# Patient Record
Sex: FEMALE | Race: WHITE | Employment: OTHER | URBAN - METROPOLITAN AREA
[De-identification: names, ages, dates, MRNs, and addresses within clinical notes are randomized per-mention and may not be internally consistent; named-entity substitution may affect disease eponyms.]

---

## 2025-02-05 NOTE — CPM/PAT H&P
CPM/PAT Evaluation       Name: Monae Mayes (Monae Mayes)  /Age: 1965/59 y.o.     In-Person       Chief Complaint: ***    HPI    Past Medical History:   Diagnosis Date    Personal history of other diseases of the nervous system and sense organs     History of cataract    Personal history of other diseases of the respiratory system     History of bronchitis       Past Surgical History:   Procedure Laterality Date    CATARACT EXTRACTION  03/10/2015    Cataract Surgery       Patient  has no history on file for sexual activity.    No family history on file.    Not on File    Prior to Admission medications    Not on File        PAT ROS     PAT Physical Exam     Airway:  Anesthesia:  Patient denies any anesthesia complications.   Teeth:    Testing/Diagnostic:         Patient Specialist/PCP:     There were no vitals taken for this visit.    DASI Risk Score    No data to display       Caprini DVT Assessment    No data to display       Modified Frailty Index    No data to display       CHADS2 Stroke Risk  Current as of 4 days ago        N/A 3 to 100%: High Risk   2 to < 3%: Medium Risk   0 to < 2%: Low Risk     Last Change: N/A          This score determines the patient's risk of having a stroke if the patient has atrial fibrillation.        This score is not applicable to this patient. Components are not calculated.          Revised Cardiac Risk Index    No data to display       Apfel Simplified Score    No data to display       Risk Analysis Index Results This Encounter    No data found in the last 10 encounters.       Prodigy: High Risk  Total Score: 8              Prodigy Age Score           ARISCAT Score for Postoperative Pulmonary Complications    No data to display       Hanley Perioperative Risk for Myocardial Infarction or Cardiac Arrest (GISELE)    No data to display       Assessment and Plan:   Plan for OR on  for   FUSION, SUBTALAR JOINT [83475 (CPT®)] Left General   FUSION, JOINT, FOOT [29286  (CPT®)] Left General   RECESSION, MUSCLE, GASTROCNEMIUS [45760 (CPT®)] Left General   BMP, CBC with diff    HEENT/Airway:  no significant findings on chart review or clinical presentation    Cardiovascular:  no significant findings on chart review or clinical presentation  DASI  RCRI: {RCRI Points:95863} risk for postoperative MACE   GISELE: ***% risk for postoperative MACE      Pulmonary:  no significant findings on chart review or clinical presentation    Preoperative deep breathing educational handout provided to patient.  ARISCAT:      points which is a {samariscat:81427} risk of in-hospital post-op pulmonary complications   STOP BANG:     points which is a {samprodigy:86981} risk for moderate to severe JOY    Renal: no significant findings on chart review or clinical presentation    Endocrine:  no significant findings on chart review or clinical presentation    Hematologic:   no significant findings on chart review or clinical presentation    Preoperative DVT educational handout provided to patient.    Caprini Score:    points which is a {samcaprini:04974} risk of perioperative VTE    Gastrointestinal:   no significant findings on chart review or clinical presentation    Apfel: {samapfel1:16673} points {samapfel2:85710} risk for post operative N/V    Infectious disease:  no significant findings on chart review or clinical presentation     Musculoskeletal:      Neuro:    No neurologic diagnoses, however, the patient is at an increased risk for post operative delirium secondary to {samneurosmart:71724}.  Preoperative brain exercise educational handout provided to patient.    The patient is at an increased risk for perioperative stroke secondary to {samneurostroke:76688}.

## 2025-02-06 ENCOUNTER — PRE-ADMISSION TESTING (OUTPATIENT)
Dept: PREADMISSION TESTING | Facility: HOSPITAL | Age: 60
End: 2025-02-06
Payer: COMMERCIAL

## 2025-02-06 ENCOUNTER — LAB (OUTPATIENT)
Dept: LAB | Facility: HOSPITAL | Age: 60
End: 2025-02-06
Payer: COMMERCIAL

## 2025-02-06 DIAGNOSIS — Z01.818 PRE-OP TESTING: Primary | ICD-10-CM

## 2025-02-06 LAB
ANION GAP SERPL CALC-SCNC: 13 MMOL/L (ref 10–20)
BASOPHILS # BLD AUTO: 0.02 X10*3/UL (ref 0–0.1)
BASOPHILS NFR BLD AUTO: 0.3 %
BUN SERPL-MCNC: 20 MG/DL (ref 6–23)
CALCIUM SERPL-MCNC: 9.9 MG/DL (ref 8.6–10.3)
CHLORIDE SERPL-SCNC: 103 MMOL/L (ref 98–107)
CO2 SERPL-SCNC: 30 MMOL/L (ref 21–32)
CREAT SERPL-MCNC: 0.65 MG/DL (ref 0.5–1.05)
EGFRCR SERPLBLD CKD-EPI 2021: >90 ML/MIN/1.73M*2
EOSINOPHIL # BLD AUTO: 0.14 X10*3/UL (ref 0–0.7)
EOSINOPHIL NFR BLD AUTO: 2.1 %
ERYTHROCYTE [DISTWIDTH] IN BLOOD BY AUTOMATED COUNT: 13.3 % (ref 11.5–14.5)
GLUCOSE SERPL-MCNC: 96 MG/DL (ref 74–99)
HCT VFR BLD AUTO: 37.8 % (ref 36–46)
HGB BLD-MCNC: 12.3 G/DL (ref 12–16)
IMM GRANULOCYTES # BLD AUTO: 0.04 X10*3/UL (ref 0–0.7)
IMM GRANULOCYTES NFR BLD AUTO: 0.6 % (ref 0–0.9)
LYMPHOCYTES # BLD AUTO: 1.23 X10*3/UL (ref 1.2–4.8)
LYMPHOCYTES NFR BLD AUTO: 18.1 %
MCH RBC QN AUTO: 28.3 PG (ref 26–34)
MCHC RBC AUTO-ENTMCNC: 32.5 G/DL (ref 32–36)
MCV RBC AUTO: 87 FL (ref 80–100)
MONOCYTES # BLD AUTO: 0.47 X10*3/UL (ref 0.1–1)
MONOCYTES NFR BLD AUTO: 6.9 %
NEUTROPHILS # BLD AUTO: 4.88 X10*3/UL (ref 1.2–7.7)
NEUTROPHILS NFR BLD AUTO: 72 %
NRBC BLD-RTO: 0 /100 WBCS (ref 0–0)
PLATELET # BLD AUTO: 292 X10*3/UL (ref 150–450)
POTASSIUM SERPL-SCNC: 4.5 MMOL/L (ref 3.5–5.3)
RBC # BLD AUTO: 4.34 X10*6/UL (ref 4–5.2)
SODIUM SERPL-SCNC: 141 MMOL/L (ref 136–145)
WBC # BLD AUTO: 6.8 X10*3/UL (ref 4.4–11.3)

## 2025-02-06 PROCEDURE — 85025 COMPLETE CBC W/AUTO DIFF WBC: CPT

## 2025-02-06 PROCEDURE — 80048 BASIC METABOLIC PNL TOTAL CA: CPT

## 2025-02-12 NOTE — CPM/PAT H&P
CPM/PAT Evaluation       Name: Monae Mayes (Monae Mayes)  /Age: 1965/59 y.o.     In-Person       Chief Complaint: pain in left foot    HPI  This is a very pleasant 58 yo with PmHx of ADHD, tibial tendonitis, and acquired left flat foot.  Pt states she is no longer able to walk long distances due to foot pain.  She denies recent fever or chills.    Past Medical History:   Diagnosis Date    ADHD (attention deficit hyperactivity disorder)     Arthritis     Flat foot (pes planus) (acquired), left foot     Flat foot (pes planus) (acquired), left foot     Obesity     Personal history of other diseases of the nervous system and sense organs     History of cataract    Personal history of other diseases of the respiratory system     History of bronchitis    Posterior tibial tendinitis, left leg     Posterior tibial tendinitis, left leg     Sleep apnea        Past Surgical History:   Procedure Laterality Date    CATARACT EXTRACTION  03/10/2015    Cataract Surgery    ENDOMETRIAL ABLATION      FOOT SURGERY      HYSTERECTOMY      JOINT REPLACEMENT      OTHER SURGICAL HISTORY      SINUS SURGERY         Patient  has no history on file for sexual activity.    Family History   Problem Relation Name Age of Onset    Arthritis Mother Angela Tristan     Arthritis Maternal Grandmother Danielle Baclene     Stroke Maternal Grandmother Danielle Baclene     Stroke Maternal Grandfather      Stroke Paternal Grandmother      Stroke Paternal Grandfather         Allergies   Allergen Reactions    Prochlorperazine Other     Muscle contractions and eyes rolling back       Prior to Admission medications    Not on File      Current Outpatient Medications on File Prior to Visit   Medication Sig Dispense Refill    FLUoxetine (PROzac) 20 mg capsule Take 1 capsule (20 mg) by mouth early in the morning..      fluticasone (Flonase) 50 mcg/actuation nasal spray Administer 1 spray into affected nostril(s) 2 times a day.      meloxicam (Mobic) 15 mg tablet  Take 1 tablet (15 mg) by mouth once daily.      multivitamin tablet Take 1 tablet by mouth once daily.      pramipexole (Mirapex) 0.125 mg tablet Take 1 tablet (0.125 mg) by mouth once daily.       No current facility-administered medications on file prior to visit.       PAT ROS:   Constitutional:   neg    Neuro/Psych:   neg    Eyes:   neg     use of corrective lenses  Ears:   neg    Nose:   neg    Mouth:   neg    Throat:   neg    Neck:    No carotid bruits auscultated  neg    Cardio:   neg    Respiratory:   neg    Endocrine:   neg    GI:   neg    :   neg    Musculoskeletal:    See HPI  Hematologic:   neg    Skin:  neg        Physical Exam  Constitutional:       Appearance: Normal appearance.   HENT:      Head: Normocephalic and atraumatic.      Nose: Nose normal.      Mouth/Throat:      Mouth: Mucous membranes are moist.   Eyes:      Pupils: Pupils are equal, round, and reactive to light.   Neck:      Comments: No carotid bruits auscultated  Cardiovascular:      Rate and Rhythm: Normal rate and regular rhythm.   Pulmonary:      Effort: Pulmonary effort is normal.      Breath sounds: Normal breath sounds.   Abdominal:      General: Bowel sounds are normal.      Palpations: Abdomen is soft.   Musculoskeletal:      Cervical back: Normal range of motion.      Comments: No LE ankle edema   Skin:     General: Skin is warm and dry.   Neurological:      General: No focal deficit present.      Mental Status: She is alert and oriented to person, place, and time.   Psychiatric:         Mood and Affect: Mood normal.         Behavior: Behavior normal.      Airway: nl neck ROM  Anesthesia:  Patient denies any anesthesia complications.   Teeth: intact    Testing/Diagnostic:   ECG: NSR rate of 80    Recent Results (from the past 3 weeks)   CBC and Auto Differential    Collection Time: 02/06/25  8:37 AM   Result Value Ref Range    WBC 6.8 4.4 - 11.3 x10*3/uL    nRBC 0.0 0.0 - 0.0 /100 WBCs    RBC 4.34 4.00 - 5.20 x10*6/uL     Hemoglobin 12.3 12.0 - 16.0 g/dL    Hematocrit 37.8 36.0 - 46.0 %    MCV 87 80 - 100 fL    MCH 28.3 26.0 - 34.0 pg    MCHC 32.5 32.0 - 36.0 g/dL    RDW 13.3 11.5 - 14.5 %    Platelets 292 150 - 450 x10*3/uL    Neutrophils % 72.0 40.0 - 80.0 %    Immature Granulocytes %, Automated 0.6 0.0 - 0.9 %    Lymphocytes % 18.1 13.0 - 44.0 %    Monocytes % 6.9 2.0 - 10.0 %    Eosinophils % 2.1 0.0 - 6.0 %    Basophils % 0.3 0.0 - 2.0 %    Neutrophils Absolute 4.88 1.20 - 7.70 x10*3/uL    Immature Granulocytes Absolute, Automated 0.04 0.00 - 0.70 x10*3/uL    Lymphocytes Absolute 1.23 1.20 - 4.80 x10*3/uL    Monocytes Absolute 0.47 0.10 - 1.00 x10*3/uL    Eosinophils Absolute 0.14 0.00 - 0.70 x10*3/uL    Basophils Absolute 0.02 0.00 - 0.10 x10*3/uL   Basic metabolic panel    Collection Time: 02/06/25  8:37 AM   Result Value Ref Range    Glucose 96 74 - 99 mg/dL    Sodium 141 136 - 145 mmol/L    Potassium 4.5 3.5 - 5.3 mmol/L    Chloride 103 98 - 107 mmol/L    Bicarbonate 30 21 - 32 mmol/L    Anion Gap 13 10 - 20 mmol/L    Urea Nitrogen 20 6 - 23 mg/dL    Creatinine 0.65 0.50 - 1.05 mg/dL    eGFR >90 >60 mL/min/1.73m*2    Calcium 9.9 8.6 - 10.3 mg/dL             Recent Results (from the past 2 weeks)   CBC and Auto Differential    Collection Time: 02/06/25  8:37 AM   Result Value Ref Range    WBC 6.8 4.4 - 11.3 x10*3/uL    nRBC 0.0 0.0 - 0.0 /100 WBCs    RBC 4.34 4.00 - 5.20 x10*6/uL    Hemoglobin 12.3 12.0 - 16.0 g/dL    Hematocrit 37.8 36.0 - 46.0 %    MCV 87 80 - 100 fL    MCH 28.3 26.0 - 34.0 pg    MCHC 32.5 32.0 - 36.0 g/dL    RDW 13.3 11.5 - 14.5 %    Platelets 292 150 - 450 x10*3/uL    Neutrophils % 72.0 40.0 - 80.0 %    Immature Granulocytes %, Automated 0.6 0.0 - 0.9 %    Lymphocytes % 18.1 13.0 - 44.0 %    Monocytes % 6.9 2.0 - 10.0 %    Eosinophils % 2.1 0.0 - 6.0 %    Basophils % 0.3 0.0 - 2.0 %    Neutrophils Absolute 4.88 1.20 - 7.70 x10*3/uL    Immature Granulocytes Absolute, Automated 0.04 0.00 - 0.70 x10*3/uL     "Lymphocytes Absolute 1.23 1.20 - 4.80 x10*3/uL    Monocytes Absolute 0.47 0.10 - 1.00 x10*3/uL    Eosinophils Absolute 0.14 0.00 - 0.70 x10*3/uL    Basophils Absolute 0.02 0.00 - 0.10 x10*3/uL   Basic metabolic panel    Collection Time: 02/06/25  8:37 AM   Result Value Ref Range    Glucose 96 74 - 99 mg/dL    Sodium 141 136 - 145 mmol/L    Potassium 4.5 3.5 - 5.3 mmol/L    Chloride 103 98 - 107 mmol/L    Bicarbonate 30 21 - 32 mmol/L    Anion Gap 13 10 - 20 mmol/L    Urea Nitrogen 20 6 - 23 mg/dL    Creatinine 0.65 0.50 - 1.05 mg/dL    eGFR >90 >60 mL/min/1.73m*2    Calcium 9.9 8.6 - 10.3 mg/dL     Patient Specialist/PCP:     Visit Vitals  /82   Pulse 80   Temp 36 °C (96.8 °F) (Temporal)   Resp 16   Ht 1.676 m (5' 6\")   Wt 120 kg (265 lb 9.6 oz)   SpO2 96%   BMI 42.87 kg/m²   Smoking Status Never   BSA 2.36 m²       DASI Risk Score      Flowsheet Row Pre-Admission Testing from 2/13/2025 in [facility] Questionnaire Series Submission from 2/6/2025 in [facility] with Generic Provider Mary   Can you take care of yourself (eat, dress, bathe, or use toilet)?  2.75 filed at 02/13/2025 0803 2.75  filed at 02/06/2025 1118   Can you walk indoors, such as around your house? 1.75 filed at 02/13/2025 0803 1.75  filed at 02/06/2025 1118   Can you walk a block or two on level ground?  2.75 filed at 02/13/2025 0803 2.75  filed at 02/06/2025 1118   Can you climb a flight of stairs or walk up a hill? 5.5 filed at 02/13/2025 0803 5.5  filed at 02/06/2025 1118   Can you run a short distance? 0 filed at 02/13/2025 0803 0  filed at 02/06/2025 1118   Can you do light work around the house like dusting or washing dishes? 2.7 filed at 02/13/2025 0803 2.7  filed at 02/06/2025 1118   Can you do moderate work around the house like vacuuming, sweeping floors or carrying groceries? 3.5 filed at 02/13/2025 0803 3.5  filed at 02/06/2025 1118   Can you do heavy work around the house like scrubbing floors or " lifting and moving heavy furniture?  8 filed at 02/13/2025 0803 8  filed at 02/06/2025 1118   Can you do yard work like raking leaves, weeding or pushing a mower? 4.5 filed at 02/13/2025 0803 4.5  filed at 02/06/2025 1118   Can you have sexual relations? 5.25 filed at 02/13/2025 0803 5.25  filed at 02/06/2025 1118   Can you participate in moderate recreational activities like golf, bowling, dancing, doubles tennis or throwing a baseball or football? 6 filed at 02/13/2025 0803 6  filed at 02/06/2025 1118   Can you participate in strenous sports like swimming, singles tennis, football, basketball, or skiing? 0 filed at 02/13/2025 0803 7.5  filed at 02/06/2025 1118   DASI SCORE 42.7 filed at 02/13/2025 0803 50.2  filed at 02/06/2025 1118   METS Score (Will be calculated only when all the questions are answered) 8 filed at 02/13/2025 0803 8.9  filed at 02/06/2025 1118          Caprini DVT Assessment      Flowsheet Row Pre-Admission Testing from 2/13/2025 in [facility]   DVT Score (IF A SCORE IS NOT CALCULATING, MUST SELECT A BMI TO COMPLETE) 7 filed at 02/13/2025 0818   Surgical Factors Major surgery planned, including arthroscopic and laproscopic (1-2 hours) filed at 02/13/2025 0818   BMI (BMI MUST BE CHOSEN) 41-50 (Morbid obesity) filed at 02/13/2025 0818          Modified Frailty Index      Flowsheet Row Pre-Admission Testing from 2/13/2025 in [facility]   Non-independent functional status (problems with dressing, bathing, personal grooming, or cooking) 0 filed at 02/13/2025 0804   History of diabetes mellitus  0 filed at 02/13/2025 0804   History of COPD 0 filed at 02/13/2025 0804   History of CHF No filed at 02/13/2025 0804   History of MI 0 filed at 02/13/2025 0804   History of Percutaneous Coronary Intervention, Cardiac Surgery, or Angina No filed at 02/13/2025 0804   Hypertension requiring the use of medication  0 filed at 02/13/2025 0804   Peripheral vascular disease 0 filed at  02/13/2025 0804   Impaired sensorium (cognitive impairement or loss, clouding, or delirium) 0 filed at 02/13/2025 0804   TIA or CVA withouy residual deficit 0 filed at 02/13/2025 0804   Cerebrovascular accident with deficit 0 filed at 02/13/2025 0804   Modified Frailty Index Calculator 0 filed at 02/13/2025 0804          CHADS2 Stroke Risk  Current as of 37 minutes ago        N/A 3 to 100%: High Risk   2 to < 3%: Medium Risk   0 to < 2%: Low Risk     Last Change: N/A          This score determines the patient's risk of having a stroke if the patient has atrial fibrillation.        This score is not applicable to this patient. Components are not calculated.          Revised Cardiac Risk Index      Flowsheet Row Pre-Admission Testing from 2/13/2025 in [facility]   High-Risk Surgery (Intraperitoneal, Intrathoracic,Suprainguinal vascular) 0 filed at 02/13/2025 0803   History of ischemic heart disease (History of MI, History of positive exercuse test, Current chest paint considered due to myocardial ischemia, Use of nitrate therapy, ECG with pathological Q Waves) 0 filed at 02/13/2025 0803   History of congestive heart failure (pulmonary edemia, bilateral rales or S3 gallop, Paroxysmal nocturnal dyspnea, CXR showing pulmonary vascular redistribution) 0 filed at 02/13/2025 0803   History of cerebrovascular disease (Prior TIA or stroke) 0 filed at 02/13/2025 0803   Pre-operative insulin treatment 0 filed at 02/13/2025 0803   Pre-operative creatinine>2 mg/dl 0 filed at 02/13/2025 0803   Revised Cardiac Risk Calculator 0 filed at 02/13/2025 0803          Apfel Simplified Score      Flowsheet Row Pre-Admission Testing from 2/13/2025 in [facility]   Smoking status 1 filed at 02/13/2025 0804   History of motion sickness or PONV  0 filed at 02/13/2025 0804   Use of postoperative opioids 1 filed at 02/13/2025 0804   Gender - Female 1=Yes filed at 02/13/2025 0804   Apfel Simplified Score Calculator  3 filed at 2025 0804          Risk Analysis Index Results This Encounter         2025  0804             Do you live in a place other than your own home?: 0    When did you begin living in the place you are currently residing?: Greater than one year ago    Any kidney failure, kidney not working well, or seeing a kidney doctor (nephrologist)? If yes, was this for kidney stones or another problem?: 0 No    Any history of chronic (long-term) congestive heart failure (CHF)?: 0 No    Any shortness of breath when resting?: 0 No    In the past five years, have you been diagnosed with or treated for cancer?: No    During the last 3 months has it become difficult for you to remember things or organize your thoughts?: 0 No    Have you lost weight of 10 pounds or more in the past 3 months without trying?: 0 No    Do you have any loss of appetitie?: 0 No    Getting Around (Mobility): 0 Can get around without help    Eatin Can plan and prepare own meals    Toiletin Can use toilet without any help    Personal Hygiene (Bathing, Hand Washing, Changing Clothes): 0 Can shower or bathe without any help    [place] Cancer History: Patient does not indicate history of cancer    Total Risk Analysis Index Score Without Cancer: 18    Total Risk Analysis Index Score: 18          Stop Bang Score      Flowsheet Row Pre-Admission Testing from 2025 in [facility] Questionnaire Series Submission from 2025 in [facility] with Generic Provider Mary   Do you snore loudly? 0 filed at 2025 0803 1  filed at 2025 1118   Do you often feel tired or fatigued after your sleep? 0 filed at 2025 0803 0  filed at 2025 1118   Has anyone ever observed you stop breathing in your sleep? 0 filed at 2025 0803 0  filed at 2025 1118   Do you have or are you being treated for high blood pressure? 0 filed at 2025 0803 0  filed at 2025 1118   Recent BMI (Calculated) 42.3 filed at  02/13/2025 0803 42.3  filed at 02/06/2025 1118   Is BMI greater than 35 kg/m2? 1=Yes filed at 02/13/2025 0803 1=Yes  filed at 02/06/2025 1118   Age older than 50 years old? 1=Yes filed at 02/13/2025 0803 1=Yes  filed at 02/06/2025 1118   Is your neck circumference greater than 17 inches (Male) or 16 inches (Female)? 1 filed at 02/13/2025 0803 --   Gender - Male 0=No filed at 02/13/2025 0803 0=No  filed at 02/06/2025 1118   STOP-BANG Total Score 3 filed at 02/13/2025 0803 --          Prodigy: High Risk  Total Score: 8              Prodigy Age Score           ARISCAT Score for Postoperative Pulmonary Complications      Flowsheet Row Pre-Admission Testing from 2/13/2025 in [facility]   Age Calculated Score 3 filed at 02/13/2025 0804   Preoperative SpO2 0 filed at 02/13/2025 0804   Respiratory infection in the last month Either upper or lower (i.e., URI, bronchitis, pneumonia), with fever and antibiotic treatment 0 filed at 02/13/2025 0804   Preoperative anemia (Hgb less than 10 g/dl) 0 filed at 02/13/2025 0804   Surgical incision  0 filed at 02/13/2025 0804   Duration of surgery  0 filed at 02/13/2025 0804   Emergency Procedure  0 filed at 02/13/2025 0804   ARISCAT Total Score  3 filed at 02/13/2025 0804          [place] Perioperative Risk for Myocardial Infarction or Cardiac Arrest (GISELE)      Flowsheet Row Pre-Admission Testing from 2/13/2025 in [facility]   Calculated Age Score 1.2 filed at 02/13/2025 0804   Functional Status  0 filed at 02/13/2025 0804   ASA Class  -3.29 filed at 02/13/2025 0804   Type of Procedure  0.80 filed at 02/13/2025 0804        Assessment and Plan:     Plan for OR on for 2/18   FUSION, SUBTALAR JOINT [83226 (CPT®)] Left General   FUSION, JOINT, FOOT [98444 (CPT®)] Left General   RECESSION, MUSCLE, GASTROCNEMIUS [91077 (CPT®)] Left General   Due to Flat foot [pes planus] (acquired)   & post tibial tendinitis left  Most recent labs enclosed    HEENT/Airway:  no  significant findings on chart review or clinical presentation    Cardiovascular:  no significant findings on chart review or clinical presentation  DASI  Duke Activity Status Index (DASI)  DASI Score: 42.7   MET Score: 8  RCRI: 0 points, 3.9%  risk for postoperative MACE   GISELE: 0.14% risk for postoperative MACE      Pulmonary:  no significant findings on chart review or clinical presentation  Pt has hx of nasal surgery for deviated septum    Preoperative deep breathing educational handout provided to patient.  ARISCAT:   3   points which is a low (1.6%) risk of in-hospital post-op pulmonary complications   STOP BANG:  3   points which is a intermediate risk for moderate to severe JOY    Renal: no significant findings on chart review or clinical presentation    Endocrine:  no significant findings on chart review or clinical presentation    Hematologic:   no significant findings on chart review or clinical presentation  Preoperative DVT educational handout provided to patient.  Caprini Score: 7   points which is a high risk of perioperative VTE    Gastrointestinal:   no significant findings on chart review or clinical presentation  Apfel: 3 points 61% risk for post operative N/V    Infectious disease:  no significant findings on chart review or clinical presentation     Musculoskeletal:      Neuro:    No neurologic diagnoses  Preoperative brain exercise educational handout provided to patient.  The patient is at an increased risk for perioperative stroke secondary to female sex , general anesthesia, and op time >2.5 hours.

## 2025-02-12 NOTE — H&P (VIEW-ONLY)
CPM/PAT Evaluation       Name: Monae Mayes (Monae Mayes)  /Age: 1965/59 y.o.     In-Person       Chief Complaint: pain in left foot    HPI  This is a very pleasant 60 yo with PmHx of ADHD, tibial tendonitis, and acquired left flat foot.  Pt states she is no longer able to walk long distances due to foot pain.  She denies recent fever or chills.    Past Medical History:   Diagnosis Date    ADHD (attention deficit hyperactivity disorder)     Arthritis     Flat foot (pes planus) (acquired), left foot     Flat foot (pes planus) (acquired), left foot     Obesity     Personal history of other diseases of the nervous system and sense organs     History of cataract    Personal history of other diseases of the respiratory system     History of bronchitis    Posterior tibial tendinitis, left leg     Posterior tibial tendinitis, left leg     Sleep apnea        Past Surgical History:   Procedure Laterality Date    CATARACT EXTRACTION  03/10/2015    Cataract Surgery    ENDOMETRIAL ABLATION      FOOT SURGERY      HYSTERECTOMY      JOINT REPLACEMENT      OTHER SURGICAL HISTORY      SINUS SURGERY         Patient  has no history on file for sexual activity.    Family History   Problem Relation Name Age of Onset    Arthritis Mother Angela Tristan     Arthritis Maternal Grandmother Danielle Baclene     Stroke Maternal Grandmother Danielle Baclene     Stroke Maternal Grandfather      Stroke Paternal Grandmother      Stroke Paternal Grandfather         Allergies   Allergen Reactions    Prochlorperazine Other     Muscle contractions and eyes rolling back       Prior to Admission medications    Not on File      Current Outpatient Medications on File Prior to Visit   Medication Sig Dispense Refill    FLUoxetine (PROzac) 20 mg capsule Take 1 capsule (20 mg) by mouth early in the morning..      fluticasone (Flonase) 50 mcg/actuation nasal spray Administer 1 spray into affected nostril(s) 2 times a day.      meloxicam (Mobic) 15 mg tablet  Take 1 tablet (15 mg) by mouth once daily.      multivitamin tablet Take 1 tablet by mouth once daily.      pramipexole (Mirapex) 0.125 mg tablet Take 1 tablet (0.125 mg) by mouth once daily.       No current facility-administered medications on file prior to visit.       PAT ROS:   Constitutional:   neg    Neuro/Psych:   neg    Eyes:   neg     use of corrective lenses  Ears:   neg    Nose:   neg    Mouth:   neg    Throat:   neg    Neck:    No carotid bruits auscultated  neg    Cardio:   neg    Respiratory:   neg    Endocrine:   neg    GI:   neg    :   neg    Musculoskeletal:    See HPI  Hematologic:   neg    Skin:  neg        Physical Exam  Constitutional:       Appearance: Normal appearance.   HENT:      Head: Normocephalic and atraumatic.      Nose: Nose normal.      Mouth/Throat:      Mouth: Mucous membranes are moist.   Eyes:      Pupils: Pupils are equal, round, and reactive to light.   Neck:      Comments: No carotid bruits auscultated  Cardiovascular:      Rate and Rhythm: Normal rate and regular rhythm.   Pulmonary:      Effort: Pulmonary effort is normal.      Breath sounds: Normal breath sounds.   Abdominal:      General: Bowel sounds are normal.      Palpations: Abdomen is soft.   Musculoskeletal:      Cervical back: Normal range of motion.      Comments: No LE ankle edema   Skin:     General: Skin is warm and dry.   Neurological:      General: No focal deficit present.      Mental Status: She is alert and oriented to person, place, and time.   Psychiatric:         Mood and Affect: Mood normal.         Behavior: Behavior normal.      Airway: nl neck ROM  Anesthesia:  Patient denies any anesthesia complications.   Teeth: intact    Testing/Diagnostic:   ECG: NSR rate of 80    Recent Results (from the past 3 weeks)   CBC and Auto Differential    Collection Time: 02/06/25  8:37 AM   Result Value Ref Range    WBC 6.8 4.4 - 11.3 x10*3/uL    nRBC 0.0 0.0 - 0.0 /100 WBCs    RBC 4.34 4.00 - 5.20 x10*6/uL     Hemoglobin 12.3 12.0 - 16.0 g/dL    Hematocrit 37.8 36.0 - 46.0 %    MCV 87 80 - 100 fL    MCH 28.3 26.0 - 34.0 pg    MCHC 32.5 32.0 - 36.0 g/dL    RDW 13.3 11.5 - 14.5 %    Platelets 292 150 - 450 x10*3/uL    Neutrophils % 72.0 40.0 - 80.0 %    Immature Granulocytes %, Automated 0.6 0.0 - 0.9 %    Lymphocytes % 18.1 13.0 - 44.0 %    Monocytes % 6.9 2.0 - 10.0 %    Eosinophils % 2.1 0.0 - 6.0 %    Basophils % 0.3 0.0 - 2.0 %    Neutrophils Absolute 4.88 1.20 - 7.70 x10*3/uL    Immature Granulocytes Absolute, Automated 0.04 0.00 - 0.70 x10*3/uL    Lymphocytes Absolute 1.23 1.20 - 4.80 x10*3/uL    Monocytes Absolute 0.47 0.10 - 1.00 x10*3/uL    Eosinophils Absolute 0.14 0.00 - 0.70 x10*3/uL    Basophils Absolute 0.02 0.00 - 0.10 x10*3/uL   Basic metabolic panel    Collection Time: 02/06/25  8:37 AM   Result Value Ref Range    Glucose 96 74 - 99 mg/dL    Sodium 141 136 - 145 mmol/L    Potassium 4.5 3.5 - 5.3 mmol/L    Chloride 103 98 - 107 mmol/L    Bicarbonate 30 21 - 32 mmol/L    Anion Gap 13 10 - 20 mmol/L    Urea Nitrogen 20 6 - 23 mg/dL    Creatinine 0.65 0.50 - 1.05 mg/dL    eGFR >90 >60 mL/min/1.73m*2    Calcium 9.9 8.6 - 10.3 mg/dL             Recent Results (from the past 2 weeks)   CBC and Auto Differential    Collection Time: 02/06/25  8:37 AM   Result Value Ref Range    WBC 6.8 4.4 - 11.3 x10*3/uL    nRBC 0.0 0.0 - 0.0 /100 WBCs    RBC 4.34 4.00 - 5.20 x10*6/uL    Hemoglobin 12.3 12.0 - 16.0 g/dL    Hematocrit 37.8 36.0 - 46.0 %    MCV 87 80 - 100 fL    MCH 28.3 26.0 - 34.0 pg    MCHC 32.5 32.0 - 36.0 g/dL    RDW 13.3 11.5 - 14.5 %    Platelets 292 150 - 450 x10*3/uL    Neutrophils % 72.0 40.0 - 80.0 %    Immature Granulocytes %, Automated 0.6 0.0 - 0.9 %    Lymphocytes % 18.1 13.0 - 44.0 %    Monocytes % 6.9 2.0 - 10.0 %    Eosinophils % 2.1 0.0 - 6.0 %    Basophils % 0.3 0.0 - 2.0 %    Neutrophils Absolute 4.88 1.20 - 7.70 x10*3/uL    Immature Granulocytes Absolute, Automated 0.04 0.00 - 0.70 x10*3/uL     "Lymphocytes Absolute 1.23 1.20 - 4.80 x10*3/uL    Monocytes Absolute 0.47 0.10 - 1.00 x10*3/uL    Eosinophils Absolute 0.14 0.00 - 0.70 x10*3/uL    Basophils Absolute 0.02 0.00 - 0.10 x10*3/uL   Basic metabolic panel    Collection Time: 02/06/25  8:37 AM   Result Value Ref Range    Glucose 96 74 - 99 mg/dL    Sodium 141 136 - 145 mmol/L    Potassium 4.5 3.5 - 5.3 mmol/L    Chloride 103 98 - 107 mmol/L    Bicarbonate 30 21 - 32 mmol/L    Anion Gap 13 10 - 20 mmol/L    Urea Nitrogen 20 6 - 23 mg/dL    Creatinine 0.65 0.50 - 1.05 mg/dL    eGFR >90 >60 mL/min/1.73m*2    Calcium 9.9 8.6 - 10.3 mg/dL     Patient Specialist/PCP:     Visit Vitals  /82   Pulse 80   Temp 36 °C (96.8 °F) (Temporal)   Resp 16   Ht 1.676 m (5' 6\")   Wt 120 kg (265 lb 9.6 oz)   SpO2 96%   BMI 42.87 kg/m²   Smoking Status Never   BSA 2.36 m²       DASI Risk Score      Flowsheet Row Pre-Admission Testing from 2/13/2025 in [facility] [place] Questionnaire Series Submission from 2/6/2025 in [facility] with Generic Provider Mary   Can you take care of yourself (eat, dress, bathe, or use toilet)?  2.75 filed at 02/13/2025 0803 2.75  filed at 02/06/2025 1118   Can you walk indoors, such as around your house? 1.75 filed at 02/13/2025 0803 1.75  filed at 02/06/2025 1118   Can you walk a block or two on level ground?  2.75 filed at 02/13/2025 0803 2.75  filed at 02/06/2025 1118   Can you climb a flight of stairs or walk up a hill? 5.5 filed at 02/13/2025 0803 5.5  filed at 02/06/2025 1118   Can you run a short distance? 0 filed at 02/13/2025 0803 0  filed at 02/06/2025 1118   Can you do light work around the house like dusting or washing dishes? 2.7 filed at 02/13/2025 0803 2.7  filed at 02/06/2025 1118   Can you do moderate work around the house like vacuuming, sweeping floors or carrying groceries? 3.5 filed at 02/13/2025 0803 3.5  filed at 02/06/2025 1118   Can you do heavy work around the house like scrubbing floors or " lifting and moving heavy furniture?  8 filed at 02/13/2025 0803 8  filed at 02/06/2025 1118   Can you do yard work like raking leaves, weeding or pushing a mower? 4.5 filed at 02/13/2025 0803 4.5  filed at 02/06/2025 1118   Can you have sexual relations? 5.25 filed at 02/13/2025 0803 5.25  filed at 02/06/2025 1118   Can you participate in moderate recreational activities like golf, bowling, dancing, doubles tennis or throwing a baseball or football? 6 filed at 02/13/2025 0803 6  filed at 02/06/2025 1118   Can you participate in strenous sports like swimming, singles tennis, football, basketball, or skiing? 0 filed at 02/13/2025 0803 7.5  filed at 02/06/2025 1118   DASI SCORE 42.7 filed at 02/13/2025 0803 50.2  filed at 02/06/2025 1118   METS Score (Will be calculated only when all the questions are answered) 8 filed at 02/13/2025 0803 8.9  filed at 02/06/2025 1118          Caprini DVT Assessment      Flowsheet Row Pre-Admission Testing from 2/13/2025 in [facility]   DVT Score (IF A SCORE IS NOT CALCULATING, MUST SELECT A BMI TO COMPLETE) 7 filed at 02/13/2025 0818   Surgical Factors Major surgery planned, including arthroscopic and laproscopic (1-2 hours) filed at 02/13/2025 0818   BMI (BMI MUST BE CHOSEN) 41-50 (Morbid obesity) filed at 02/13/2025 0818          Modified Frailty Index      Flowsheet Row Pre-Admission Testing from 2/13/2025 in [facility]   Non-independent functional status (problems with dressing, bathing, personal grooming, or cooking) 0 filed at 02/13/2025 0804   History of diabetes mellitus  0 filed at 02/13/2025 0804   History of COPD 0 filed at 02/13/2025 0804   History of CHF No filed at 02/13/2025 0804   History of MI 0 filed at 02/13/2025 0804   History of Percutaneous Coronary Intervention, Cardiac Surgery, or Angina No filed at 02/13/2025 0804   Hypertension requiring the use of medication  0 filed at 02/13/2025 0804   Peripheral vascular disease 0 filed at  02/13/2025 0804   Impaired sensorium (cognitive impairement or loss, clouding, or delirium) 0 filed at 02/13/2025 0804   TIA or CVA withouy residual deficit 0 filed at 02/13/2025 0804   Cerebrovascular accident with deficit 0 filed at 02/13/2025 0804   Modified Frailty Index Calculator 0 filed at 02/13/2025 0804          CHADS2 Stroke Risk  Current as of 37 minutes ago        N/A 3 to 100%: High Risk   2 to < 3%: Medium Risk   0 to < 2%: Low Risk     Last Change: N/A          This score determines the patient's risk of having a stroke if the patient has atrial fibrillation.        This score is not applicable to this patient. Components are not calculated.          Revised Cardiac Risk Index      Flowsheet Row Pre-Admission Testing from 2/13/2025 in [facility]   High-Risk Surgery (Intraperitoneal, Intrathoracic,Suprainguinal vascular) 0 filed at 02/13/2025 0803   History of ischemic heart disease (History of MI, History of positive exercuse test, Current chest paint considered due to myocardial ischemia, Use of nitrate therapy, ECG with pathological Q Waves) 0 filed at 02/13/2025 0803   History of congestive heart failure (pulmonary edemia, bilateral rales or S3 gallop, Paroxysmal nocturnal dyspnea, CXR showing pulmonary vascular redistribution) 0 filed at 02/13/2025 0803   History of cerebrovascular disease (Prior TIA or stroke) 0 filed at 02/13/2025 0803   Pre-operative insulin treatment 0 filed at 02/13/2025 0803   Pre-operative creatinine>2 mg/dl 0 filed at 02/13/2025 0803   Revised Cardiac Risk Calculator 0 filed at 02/13/2025 0803          Apfel Simplified Score      Flowsheet Row Pre-Admission Testing from 2/13/2025 in [facility]   Smoking status 1 filed at 02/13/2025 0804   History of motion sickness or PONV  0 filed at 02/13/2025 0804   Use of postoperative opioids 1 filed at 02/13/2025 0804   Gender - Female 1=Yes filed at 02/13/2025 0804   Apfel Simplified Score Calculator  3 filed at 2025 0804          Risk Analysis Index Results This Encounter         2025  0804             Do you live in a place other than your own home?: 0    When did you begin living in the place you are currently residing?: Greater than one year ago    Any kidney failure, kidney not working well, or seeing a kidney doctor (nephrologist)? If yes, was this for kidney stones or another problem?: 0 No    Any history of chronic (long-term) congestive heart failure (CHF)?: 0 No    Any shortness of breath when resting?: 0 No    In the past five years, have you been diagnosed with or treated for cancer?: No    During the last 3 months has it become difficult for you to remember things or organize your thoughts?: 0 No    Have you lost weight of 10 pounds or more in the past 3 months without trying?: 0 No    Do you have any loss of appetitie?: 0 No    Getting Around (Mobility): 0 Can get around without help    Eatin Can plan and prepare own meals    Toiletin Can use toilet without any help    Personal Hygiene (Bathing, Hand Washing, Changing Clothes): 0 Can shower or bathe without any help    [place] Cancer History: Patient does not indicate history of cancer    Total Risk Analysis Index Score Without Cancer: 18    Total Risk Analysis Index Score: 18          Stop Bang Score      Flowsheet Row Pre-Admission Testing from 2025 in [facility] Questionnaire Series Submission from 2025 in [facility] with Generic Provider Mary   Do you snore loudly? 0 filed at 2025 0803 1  filed at 2025 1118   Do you often feel tired or fatigued after your sleep? 0 filed at 2025 0803 0  filed at 2025 1118   Has anyone ever observed you stop breathing in your sleep? 0 filed at 2025 0803 0  filed at 2025 1118   Do you have or are you being treated for high blood pressure? 0 filed at 2025 0803 0  filed at 2025 1118   Recent BMI (Calculated) 42.3 filed at  02/13/2025 0803 42.3  filed at 02/06/2025 1118   Is BMI greater than 35 kg/m2? 1=Yes filed at 02/13/2025 0803 1=Yes  filed at 02/06/2025 1118   Age older than 50 years old? 1=Yes filed at 02/13/2025 0803 1=Yes  filed at 02/06/2025 1118   Is your neck circumference greater than 17 inches (Male) or 16 inches (Female)? 1 filed at 02/13/2025 0803 --   Gender - Male 0=No filed at 02/13/2025 0803 0=No  filed at 02/06/2025 1118   STOP-BANG Total Score 3 filed at 02/13/2025 0803 --          Prodigy: High Risk  Total Score: 8              Prodigy Age Score           ARISCAT Score for Postoperative Pulmonary Complications      Flowsheet Row Pre-Admission Testing from 2/13/2025 in [facility]   Age Calculated Score 3 filed at 02/13/2025 0804   Preoperative SpO2 0 filed at 02/13/2025 0804   Respiratory infection in the last month Either upper or lower (i.e., URI, bronchitis, pneumonia), with fever and antibiotic treatment 0 filed at 02/13/2025 0804   Preoperative anemia (Hgb less than 10 g/dl) 0 filed at 02/13/2025 0804   Surgical incision  0 filed at 02/13/2025 0804   Duration of surgery  0 filed at 02/13/2025 0804   Emergency Procedure  0 filed at 02/13/2025 0804   ARISCAT Total Score  3 filed at 02/13/2025 0804          [place] Perioperative Risk for Myocardial Infarction or Cardiac Arrest (GISELE)      Flowsheet Row Pre-Admission Testing from 2/13/2025 in [facility]   Calculated Age Score 1.2 filed at 02/13/2025 0804   Functional Status  0 filed at 02/13/2025 0804   ASA Class  -3.29 filed at 02/13/2025 0804   Type of Procedure  0.80 filed at 02/13/2025 0804        Assessment and Plan:     Plan for OR on for 2/18   FUSION, SUBTALAR JOINT [87134 (CPT®)] Left General   FUSION, JOINT, FOOT [58610 (CPT®)] Left General   RECESSION, MUSCLE, GASTROCNEMIUS [81968 (CPT®)] Left General   Due to Flat foot [pes planus] (acquired)   & post tibial tendinitis left  Most recent labs enclosed    HEENT/Airway:  no  significant findings on chart review or clinical presentation    Cardiovascular:  no significant findings on chart review or clinical presentation  DASI  [facility] Activity Status Index (DASI)  DASI Score: 42.7   MET Score: 8  RCRI: 0 points, 3.9%  risk for postoperative MACE   GISELE: 0.14% risk for postoperative MACE      Pulmonary:  no significant findings on chart review or clinical presentation  Pt has hx of nasal surgery for deviated septum    Preoperative deep breathing educational handout provided to patient.  ARISCAT:   3   points which is a low (1.6%) risk of in-hospital post-op pulmonary complications   STOP BANG:  3   points which is a intermediate risk for moderate to severe JOY    Renal: no significant findings on chart review or clinical presentation    Endocrine:  no significant findings on chart review or clinical presentation    Hematologic:   no significant findings on chart review or clinical presentation  Preoperative DVT educational handout provided to patient.  Caprini Score: 7   points which is a high risk of perioperative VTE    Gastrointestinal:   no significant findings on chart review or clinical presentation  Apfel: 3 points 61% risk for post operative N/V    Infectious disease:  no significant findings on chart review or clinical presentation     Musculoskeletal:      Neuro:    No neurologic diagnoses  Preoperative brain exercise educational handout provided to patient.  The patient is at an increased risk for perioperative stroke secondary to female sex , general anesthesia, and op time >2.5 hours.

## 2025-02-13 ENCOUNTER — PRE-ADMISSION TESTING (OUTPATIENT)
Dept: PREADMISSION TESTING | Facility: HOSPITAL | Age: 60
End: 2025-02-13
Payer: COMMERCIAL

## 2025-02-13 VITALS
WEIGHT: 265.6 LBS | HEART RATE: 80 BPM | SYSTOLIC BLOOD PRESSURE: 149 MMHG | TEMPERATURE: 96.8 F | HEIGHT: 66 IN | DIASTOLIC BLOOD PRESSURE: 82 MMHG | RESPIRATION RATE: 16 BRPM | OXYGEN SATURATION: 96 % | BODY MASS INDEX: 42.68 KG/M2

## 2025-02-13 DIAGNOSIS — Z01.818 PRE-OP TESTING: Primary | ICD-10-CM

## 2025-02-13 PROCEDURE — 99202 OFFICE O/P NEW SF 15 MIN: CPT | Performed by: NURSE PRACTITIONER

## 2025-02-13 PROCEDURE — 93005 ELECTROCARDIOGRAM TRACING: CPT

## 2025-02-13 RX ORDER — BISMUTH SUBSALICYLATE 262 MG
1 TABLET,CHEWABLE ORAL DAILY
COMMUNITY

## 2025-02-13 RX ORDER — FLUOXETINE HYDROCHLORIDE 20 MG/1
1 CAPSULE ORAL
COMMUNITY
Start: 2025-02-03

## 2025-02-13 RX ORDER — MELOXICAM 15 MG/1
15 TABLET ORAL DAILY
COMMUNITY

## 2025-02-13 RX ORDER — PRAMIPEXOLE DIHYDROCHLORIDE 0.12 MG/1
0.12 TABLET ORAL DAILY
COMMUNITY

## 2025-02-13 RX ORDER — FLUTICASONE PROPIONATE 50 MCG
1 SPRAY, SUSPENSION (ML) NASAL 2 TIMES DAILY
COMMUNITY
Start: 2015-03-11

## 2025-02-13 ASSESSMENT — ACTIVITIES OF DAILY LIVING (ADL): ADL_SCORE: 0

## 2025-02-13 ASSESSMENT — DUKE ACTIVITY SCORE INDEX (DASI)
CAN YOU WALK A BLOCK OR TWO ON LEVEL GROUND: YES
CAN YOU DO YARD WORK LIKE RAKING LEAVES, WEEDING OR PUSHING A MOWER: YES
TOTAL_SCORE: 42.7
CAN YOU RUN A SHORT DISTANCE: NO
CAN YOU CLIMB A FLIGHT OF STAIRS OR WALK UP A HILL: YES
CAN YOU TAKE CARE OF YOURSELF (EAT, DRESS, BATHE, OR USE TOILET): YES
CAN YOU HAVE SEXUAL RELATIONS: YES
CAN YOU PARTICIPATE IN MODERATE RECREATIONAL ACTIVITIES LIKE GOLF, BOWLING, DANCING, DOUBLES TENNIS OR THROWING A BASEBALL OR FOOTBALL: YES
CAN YOU WALK INDOORS, SUCH AS AROUND YOUR HOUSE: YES
CAN YOU PARTICIPATE IN STRENOUS SPORTS LIKE SWIMMING, SINGLES TENNIS, FOOTBALL, BASKETBALL, OR SKIING: NO
CAN YOU DO MODERATE WORK AROUND THE HOUSE LIKE VACUUMING, SWEEPING FLOORS OR CARRYING GROCERIES: YES
CAN YOU DO LIGHT WORK AROUND THE HOUSE LIKE DUSTING OR WASHING DISHES: YES
DASI METS SCORE: 8
CAN YOU DO HEAVY WORK AROUND THE HOUSE LIKE SCRUBBING FLOORS OR LIFTING AND MOVING HEAVY FURNITURE: YES

## 2025-02-13 ASSESSMENT — PAIN SCALES - GENERAL: PAINLEVEL_OUTOF10: 0 - NO PAIN

## 2025-02-13 ASSESSMENT — ENCOUNTER SYMPTOMS
CARDIOVASCULAR NEGATIVE: 1
NEUROLOGICAL NEGATIVE: 1
RESPIRATORY NEGATIVE: 1
NECK NEGATIVE: 1
EYES NEGATIVE: 1
CONSTITUTIONAL NEGATIVE: 1
ENDOCRINE NEGATIVE: 1
GASTROINTESTINAL NEGATIVE: 1

## 2025-02-13 ASSESSMENT — PAIN - FUNCTIONAL ASSESSMENT: PAIN_FUNCTIONAL_ASSESSMENT: 0-10

## 2025-02-13 ASSESSMENT — LIFESTYLE VARIABLES: SMOKING_STATUS: NONSMOKER

## 2025-02-13 NOTE — PREPROCEDURE INSTRUCTIONS
Medication List            Accurate as of February 13, 2025  8:36 AM. Always use your most recent med list.                FLUoxetine 20 mg capsule  Commonly known as: PROzac  Medication Adjustments for Surgery: Take/Use as prescribed     fluticasone 50 mcg/actuation nasal spray  Commonly known as: Flonase  Medication Adjustments for Surgery: Take/Use as prescribed     meloxicam 15 mg tablet  Commonly known as: Mobic  Additional Medication Adjustments for Surgery: Take last dose 7 days before surgery     multivitamin tablet  Additional Medication Adjustments for Surgery: Take last dose 7 days before surgery     pramipexole 0.125 mg tablet  Commonly known as: Mirapex  Medication Adjustments for Surgery: Take/Use as prescribed                                PRE-OPERATIVE INSTRUCTIONS    You will receive notification one business day prior to your procedure to confirm your arrival time. It is important that you answer your phone and/or check your messages during this time. If you do not hear from the surgery center by 5 pm. the day before your procedure, please call [phone].     Please enter the building through the Outpatient entrance and take the elevator off the lobby to the 2nd floor then check in at the Outpatient Surgery desk on the 2nd floor.    INSTRUCTIONS:  Talk to your surgeon for instructions if you should stop your aspirin, blood thinner, or diabetes medicines.  DO NOT take any multivitamins or over the counter supplements for 7-10 days before surgery.  If not being admitted, you must have an adult immediately available to drive you home after surgery. We also highly recommend you have someone stay with you for the entire day and night of your surgery.  For children having surgery, a parent or legal guardian must accompany them to the surgery center. If this is not possible, please call [phone] to make additional arrangements.  For adults who are unable to consent or make medical decisions for  themselves, a legal guardian or Power of  must accompany them to the surgery center. If this is not possible, please call [phone] to make additional arrangements.  Wear comfortable, loose fitting clothing.  All jewelry and piercings must be removed. If you are unable to remove an item or have a dermal piercing, please be sure to tell the nurse when you arrive for surgery.  Nail polish and make-up must be removed.  Avoid smoking or consuming alcohol for 24 hours before surgery.  To help prevent infection, please take a shower/bath and wash your hair the night before and/or morning of surgery (or follow other specific bathing instructions provided).    Preoperative Fasting Guidelines    Why must I stop eating and drinking near surgery time?  With sedation, food or liquid in your stomach can enter your lungs causing serious complications  Increases nausea and vomiting    When do I need to stop eating and drinking before my surgery?  Do not eat any solid food after midnight the night before your surgery/procedure unless otherwise instructed by your surgeon.   You may have up to 13.5 ounces of clear liquid until TWO hours before your instructed arrival time to the hospital.  This includes water, black tea/coffee, (no milk or cream) apple juice, and electrolyte drinks (Gatorade).   You may chew gum until TWO hours before your surgery/procedure      If applicable, notify your surgeons office immediately of any new skin changes that occur to the surgical limb.      If you have any questions or concerns, please call Pre-Admission Testing at [phone].             Home Preoperative Antibacterial Shower with Chlorhexidine gluconate (CHG)     What is a home preoperative antibacterial shower?  This shower is a way of cleaning the skin with a germ killing solution before surgery. The solution contains chlorhexidine gluconate, commonly known as CHG. CHG is a skin cleanser with germ killing ability. Let your  doctor know if you are allergic to chlorhexidine.    Why do I need to take a preoperative antibacterial shower?  Skin is not sterile. It is best to try to make your skin as free of germs as possible before surgery. Proper cleansing with a germ killing soap before surgery can lower the number of germs on your skin. This helps to reduce the risk of infection at the surgical site. Following the instructions listed below will help you prepare your skin for surgery.    How do I use the solution?  Begin using your CHG soap the night before and again the morning of your procedure.   Do not shave the day before or day of surgery.  Remove all jewelry until after surgery. Take off rings and take out all body-piercing jewelry.  Wash your face and hair with normal soap and shampoo before you use the CHG soap.  Apply the CHG solution to a clean wet washcloth. Move away from the water to avoid premature rinsing of the CHG soap as you are applying. Firmly lather your entire body from the neck down. Do not use CHG on your face, eyes, ears, or genitals.   Pay special attention to the area where your incisions will be located.  Do not scrub your skin too hard.  It is important to allow the CHG soap to sit on your skin for 3-5 minutes.  Rinse the solution off your body completely. Do not wash with your normal soap after the CHG soap solution.  Pat yourself dry with a clean, soft towel.  Do not apply powders, lotions or deodorants as these might block how the CHG soap works.   Dress in clean clothing.  Be sure to sleep with clean, freshly laundered sheets.  Be aware that CHG can cause stains on fabric. Rinse your washcloth and other linens that have contact with CHG completely. Use only non-chlorine detergents to launder the items used.

## 2025-02-13 NOTE — PREPROCEDURE INSTRUCTIONS
Thank you for visiting [facility] at [facility]. If you have any changes to your health condition, please call the SURGEON's office to alert them and give them details of your symptoms.        Preoperative Brain Exercises    What are brain exercises?  A brain exercise is any activity that engages your thinking (cognitive) skills.    What types of activities are considered brain exercises?  Jigsaw puzzles, crossword puzzles, word jumble, memory games, word search, and many more.  Many can be found free online or on your phone via a mobile robe.    Why should I do brain exercises before my surgery?  More recent research has shown brain exercise before surgery can lower the risk of postoperative delirium (confusion) which can be especially important for older adults.  Patients who did brain exercises for 5 to 10 hours the days before surgery, cut their risk of postoperative delirium in half up to 1 week after surgery.      Preoperative Deep Breathing Exercises    Why it is important to do deep breathing exercises before my surgery?  Deep breathing exercises strengthen your breathing muscles.  This helps you to recover after your surgery and decreases the chance of breathing complications.    How are the deep breathing exercises done?  Sit straight with your back supported.  Breathe in deeply and slowly through your nose. Your lower rib cage should expand and your abdomen may move forward.  Hold that breath for 3 to 5 seconds.  Breathe out through pursed lips, slowly and completely.  Rest and repeat 10 times every hour while awake.  Rest longer if you become dizzy or lightheaded.      Patient and Family Education   Ways You Can Help Prevent Blood Clots     This handout explains some simple things you can do to help prevent blood clots.      Blood clots are blockages that can form in the body's veins. When a blood clot forms in your deep veins, it may be called a deep vein thrombosis, or DVT for short. Blood clots  can happen in any part of the body where blood flows, but they are most common in the arms and legs. If a piece of a blood clot breaks free and travels to the lungs, it is called a pulmonary embolus (PE). A PE can be a very serious problem.      Being in the hospital or having surgery can raise your chances of getting a blood clot because you may not be well enough to move around as much as you normally do.      Ways you can help prevent blood clots in the hospital         Wearing SCDs. SCDs stands for Sequential Compression Devices.   SCDs are special sleeves that wrap around your legs  They attach to a pump that fills them with air to gently squeeze your legs every few minutes.   This helps return the blood in your legs to your heart.   SCDs should only be taken off when walking or bathing.   SCDs may not be comfortable, but they can help save your life.               Wearing compression stockings - if your doctor orders them. These special snug fitting stockings gently squeeze your legs to help blood flow.       Walking. Walking helps move the blood in your legs.   If your doctor says it is ok, try walking the halls at least   5 times a day. Ask us to help you get up, so you don't fall.      Taking any blood thinning medicines your doctor orders.          ©[facility]; 3/23        Ways you can help prevent blood clots at home       Wearing compression stockings - if your doctor orders them. ? Walking - to help move the blood in your legs.       Taking any blood thinning medicines your doctor orders.      Signs of a blood clot or PE      Tell your doctor or nurse know right away if you have of the problems listed below.    If you are at home, seek medical care right away. Call 911 for chest pain or problems breathing.          Signs of a blood clot (DVT) - such as pain,  swelling, redness or warmth in your arm or leg      Signs of a pulmonary embolism (PE) - such as chest     pain or feeling short of  breath

## 2025-02-15 LAB
ATRIAL RATE: 80 BPM
P AXIS: 20 DEGREES
P OFFSET: 192 MS
P ONSET: 148 MS
PR INTERVAL: 140 MS
Q ONSET: 218 MS
QRS COUNT: 13 BEATS
QRS DURATION: 78 MS
QT INTERVAL: 388 MS
QTC CALCULATION(BAZETT): 447 MS
QTC FREDERICIA: 427 MS
R AXIS: 5 DEGREES
T AXIS: 15 DEGREES
T OFFSET: 412 MS
VENTRICULAR RATE: 80 BPM

## 2025-02-18 ENCOUNTER — ANESTHESIA EVENT (OUTPATIENT)
Dept: OPERATING ROOM | Facility: HOSPITAL | Age: 60
End: 2025-02-18
Payer: COMMERCIAL

## 2025-02-18 ENCOUNTER — APPOINTMENT (OUTPATIENT)
Dept: RADIOLOGY | Facility: HOSPITAL | Age: 60
End: 2025-02-18
Payer: COMMERCIAL

## 2025-02-18 ENCOUNTER — HOSPITAL ENCOUNTER (OUTPATIENT)
Facility: HOSPITAL | Age: 60
Setting detail: OUTPATIENT SURGERY
Discharge: HOME | End: 2025-02-18
Attending: ORTHOPAEDIC SURGERY | Admitting: ORTHOPAEDIC SURGERY
Payer: COMMERCIAL

## 2025-02-18 ENCOUNTER — PHARMACY VISIT (OUTPATIENT)
Dept: PHARMACY | Facility: CLINIC | Age: 60
End: 2025-02-18
Payer: COMMERCIAL

## 2025-02-18 ENCOUNTER — ANESTHESIA (OUTPATIENT)
Dept: OPERATING ROOM | Facility: HOSPITAL | Age: 60
End: 2025-02-18
Payer: COMMERCIAL

## 2025-02-18 VITALS
RESPIRATION RATE: 16 BRPM | HEART RATE: 77 BPM | SYSTOLIC BLOOD PRESSURE: 145 MMHG | BODY MASS INDEX: 42.27 KG/M2 | WEIGHT: 263 LBS | HEIGHT: 66 IN | OXYGEN SATURATION: 96 % | TEMPERATURE: 96.8 F | DIASTOLIC BLOOD PRESSURE: 64 MMHG

## 2025-02-18 DIAGNOSIS — M21.42 ACQUIRED PES PLANUS OF LEFT FOOT: Primary | ICD-10-CM

## 2025-02-18 PROCEDURE — 2500000004 HC RX 250 GENERAL PHARMACY W/ HCPCS (ALT 636 FOR OP/ED): Performed by: ANESTHESIOLOGIST ASSISTANT

## 2025-02-18 PROCEDURE — 7100000010 HC PHASE TWO TIME - EACH INCREMENTAL 1 MINUTE: Performed by: ORTHOPAEDIC SURGERY

## 2025-02-18 PROCEDURE — 2500000004 HC RX 250 GENERAL PHARMACY W/ HCPCS (ALT 636 FOR OP/ED): Mod: JZ | Performed by: ANESTHESIOLOGY

## 2025-02-18 PROCEDURE — C1889 IMPLANT/INSERT DEVICE, NOC: HCPCS | Performed by: ORTHOPAEDIC SURGERY

## 2025-02-18 PROCEDURE — 3600000003 HC OR TIME - INITIAL BASE CHARGE - PROCEDURE LEVEL THREE: Performed by: ORTHOPAEDIC SURGERY

## 2025-02-18 PROCEDURE — A27687 PR GASTROCNEMIUS RECESSION: Performed by: ANESTHESIOLOGY

## 2025-02-18 PROCEDURE — RXMED WILLOW AMBULATORY MEDICATION CHARGE

## 2025-02-18 PROCEDURE — C1713 ANCHOR/SCREW BN/BN,TIS/BN: HCPCS | Performed by: ORTHOPAEDIC SURGERY

## 2025-02-18 PROCEDURE — 64445 NJX AA&/STRD SCIATIC NRV IMG: CPT | Performed by: ANESTHESIOLOGY

## 2025-02-18 PROCEDURE — 3600000008 HC OR TIME - EACH INCREMENTAL 1 MINUTE - PROCEDURE LEVEL THREE: Performed by: ORTHOPAEDIC SURGERY

## 2025-02-18 PROCEDURE — 7100000002 HC RECOVERY ROOM TIME - EACH INCREMENTAL 1 MINUTE: Performed by: ORTHOPAEDIC SURGERY

## 2025-02-18 PROCEDURE — 2780000003 HC OR 278 NO HCPCS: Performed by: ORTHOPAEDIC SURGERY

## 2025-02-18 PROCEDURE — 7100000001 HC RECOVERY ROOM TIME - INITIAL BASE CHARGE: Performed by: ORTHOPAEDIC SURGERY

## 2025-02-18 PROCEDURE — 7100000009 HC PHASE TWO TIME - INITIAL BASE CHARGE: Performed by: ORTHOPAEDIC SURGERY

## 2025-02-18 PROCEDURE — 2500000005 HC RX 250 GENERAL PHARMACY W/O HCPCS: Performed by: ANESTHESIOLOGIST ASSISTANT

## 2025-02-18 PROCEDURE — 3700000001 HC GENERAL ANESTHESIA TIME - INITIAL BASE CHARGE: Performed by: ORTHOPAEDIC SURGERY

## 2025-02-18 PROCEDURE — 64447 NJX AA&/STRD FEMORAL NRV IMG: CPT | Performed by: ANESTHESIOLOGY

## 2025-02-18 PROCEDURE — C1769 GUIDE WIRE: HCPCS | Performed by: ORTHOPAEDIC SURGERY

## 2025-02-18 PROCEDURE — 2500000005 HC RX 250 GENERAL PHARMACY W/O HCPCS: Performed by: ANESTHESIOLOGY

## 2025-02-18 PROCEDURE — 2720000007 HC OR 272 NO HCPCS: Performed by: ORTHOPAEDIC SURGERY

## 2025-02-18 PROCEDURE — 3700000002 HC GENERAL ANESTHESIA TIME - EACH INCREMENTAL 1 MINUTE: Performed by: ORTHOPAEDIC SURGERY

## 2025-02-18 PROCEDURE — 2500000004 HC RX 250 GENERAL PHARMACY W/ HCPCS (ALT 636 FOR OP/ED): Performed by: ORTHOPAEDIC SURGERY

## 2025-02-18 PROCEDURE — A27687 PR GASTROCNEMIUS RECESSION: Performed by: ANESTHESIOLOGIST ASSISTANT

## 2025-02-18 DEVICE — IMPLANTABLE DEVICE: Type: IMPLANTABLE DEVICE | Site: FOOT | Status: FUNCTIONAL

## 2025-02-18 DEVICE — SUPERMX NITI STAPLE W/ INSTRS, 20W X 20L
Type: IMPLANTABLE DEVICE | Site: FOOT | Status: FUNCTIONAL
Brand: ARTHREX®

## 2025-02-18 RX ORDER — MIDAZOLAM HYDROCHLORIDE 1 MG/ML
INJECTION, SOLUTION INTRAMUSCULAR; INTRAVENOUS AS NEEDED
Status: DISCONTINUED | OUTPATIENT
Start: 2025-02-18 | End: 2025-02-18

## 2025-02-18 RX ORDER — LIDOCAINE HYDROCHLORIDE 20 MG/ML
INJECTION, SOLUTION EPIDURAL; INFILTRATION; INTRACAUDAL; PERINEURAL AS NEEDED
Status: DISCONTINUED | OUTPATIENT
Start: 2025-02-18 | End: 2025-02-18

## 2025-02-18 RX ORDER — HYDROMORPHONE HYDROCHLORIDE 0.2 MG/ML
0.2 INJECTION INTRAMUSCULAR; INTRAVENOUS; SUBCUTANEOUS EVERY 5 MIN PRN
Status: DISCONTINUED | OUTPATIENT
Start: 2025-02-18 | End: 2025-02-18 | Stop reason: HOSPADM

## 2025-02-18 RX ORDER — LIDOCAINE HYDROCHLORIDE 10 MG/ML
0.1 INJECTION, SOLUTION INFILTRATION; PERINEURAL ONCE
Status: DISCONTINUED | OUTPATIENT
Start: 2025-02-18 | End: 2025-02-18 | Stop reason: HOSPADM

## 2025-02-18 RX ORDER — ALBUTEROL SULFATE 0.83 MG/ML
2.5 SOLUTION RESPIRATORY (INHALATION) ONCE AS NEEDED
Status: DISCONTINUED | OUTPATIENT
Start: 2025-02-18 | End: 2025-02-18 | Stop reason: HOSPADM

## 2025-02-18 RX ORDER — OXYCODONE HYDROCHLORIDE 5 MG/1
5 TABLET ORAL EVERY 4 HOURS PRN
Status: DISCONTINUED | OUTPATIENT
Start: 2025-02-18 | End: 2025-02-18 | Stop reason: HOSPADM

## 2025-02-18 RX ORDER — ONDANSETRON 4 MG/1
4 TABLET, FILM COATED ORAL EVERY 8 HOURS PRN
Qty: 20 TABLET | Refills: 0 | Status: SHIPPED | OUTPATIENT
Start: 2025-02-18

## 2025-02-18 RX ORDER — KETOROLAC TROMETHAMINE 30 MG/ML
30 INJECTION, SOLUTION INTRAMUSCULAR; INTRAVENOUS ONCE
Status: COMPLETED | OUTPATIENT
Start: 2025-02-18 | End: 2025-02-18

## 2025-02-18 RX ORDER — LABETALOL HYDROCHLORIDE 5 MG/ML
5 INJECTION, SOLUTION INTRAVENOUS ONCE AS NEEDED
Status: DISCONTINUED | OUTPATIENT
Start: 2025-02-18 | End: 2025-02-18 | Stop reason: HOSPADM

## 2025-02-18 RX ORDER — OXYCODONE HYDROCHLORIDE 5 MG/1
5 TABLET ORAL EVERY 6 HOURS PRN
Qty: 28 TABLET | Refills: 0 | Status: SHIPPED | OUTPATIENT
Start: 2025-02-18

## 2025-02-18 RX ORDER — PHENYLEPHRINE HCL IN 0.9% NACL 1 MG/10 ML
SYRINGE (ML) INTRAVENOUS AS NEEDED
Status: DISCONTINUED | OUTPATIENT
Start: 2025-02-18 | End: 2025-02-18

## 2025-02-18 RX ORDER — ONDANSETRON HYDROCHLORIDE 2 MG/ML
INJECTION, SOLUTION INTRAVENOUS AS NEEDED
Status: DISCONTINUED | OUTPATIENT
Start: 2025-02-18 | End: 2025-02-18

## 2025-02-18 RX ORDER — HYDRALAZINE HYDROCHLORIDE 20 MG/ML
5 INJECTION INTRAMUSCULAR; INTRAVENOUS EVERY 30 MIN PRN
Status: DISCONTINUED | OUTPATIENT
Start: 2025-02-18 | End: 2025-02-18 | Stop reason: HOSPADM

## 2025-02-18 RX ORDER — FENTANYL CITRATE 50 UG/ML
INJECTION, SOLUTION INTRAMUSCULAR; INTRAVENOUS AS NEEDED
Status: DISCONTINUED | OUTPATIENT
Start: 2025-02-18 | End: 2025-02-18

## 2025-02-18 RX ORDER — ONDANSETRON HYDROCHLORIDE 2 MG/ML
4 INJECTION, SOLUTION INTRAVENOUS ONCE AS NEEDED
Status: COMPLETED | OUTPATIENT
Start: 2025-02-18 | End: 2025-02-18

## 2025-02-18 RX ORDER — ACETAMINOPHEN 325 MG/1
975 TABLET ORAL ONCE
Status: DISCONTINUED | OUTPATIENT
Start: 2025-02-18 | End: 2025-02-18 | Stop reason: HOSPADM

## 2025-02-18 RX ORDER — DIPHENHYDRAMINE HYDROCHLORIDE 50 MG/ML
12.5 INJECTION INTRAMUSCULAR; INTRAVENOUS ONCE AS NEEDED
Status: DISCONTINUED | OUTPATIENT
Start: 2025-02-18 | End: 2025-02-18 | Stop reason: HOSPADM

## 2025-02-18 RX ORDER — OXYCODONE AND ACETAMINOPHEN 5; 325 MG/1; MG/1
1 TABLET ORAL EVERY 4 HOURS PRN
Status: DISCONTINUED | OUTPATIENT
Start: 2025-02-18 | End: 2025-02-18 | Stop reason: HOSPADM

## 2025-02-18 RX ORDER — SODIUM CHLORIDE, SODIUM LACTATE, POTASSIUM CHLORIDE, CALCIUM CHLORIDE 600; 310; 30; 20 MG/100ML; MG/100ML; MG/100ML; MG/100ML
INJECTION, SOLUTION INTRAVENOUS CONTINUOUS PRN
Status: DISCONTINUED | OUTPATIENT
Start: 2025-02-18 | End: 2025-02-18

## 2025-02-18 RX ORDER — PROPOFOL 10 MG/ML
INJECTION, EMULSION INTRAVENOUS AS NEEDED
Status: DISCONTINUED | OUTPATIENT
Start: 2025-02-18 | End: 2025-02-18

## 2025-02-18 RX ORDER — SODIUM CHLORIDE, SODIUM LACTATE, POTASSIUM CHLORIDE, CALCIUM CHLORIDE 600; 310; 30; 20 MG/100ML; MG/100ML; MG/100ML; MG/100ML
100 INJECTION, SOLUTION INTRAVENOUS CONTINUOUS
Status: DISCONTINUED | OUTPATIENT
Start: 2025-02-18 | End: 2025-02-18 | Stop reason: HOSPADM

## 2025-02-18 RX ORDER — DEXAMETHASONE SODIUM PHOSPHATE 10 MG/ML
INJECTION INTRAMUSCULAR; INTRAVENOUS AS NEEDED
Status: DISCONTINUED | OUTPATIENT
Start: 2025-02-18 | End: 2025-02-18

## 2025-02-18 RX ORDER — HYDROMORPHONE HYDROCHLORIDE 1 MG/ML
INJECTION, SOLUTION INTRAMUSCULAR; INTRAVENOUS; SUBCUTANEOUS AS NEEDED
Status: DISCONTINUED | OUTPATIENT
Start: 2025-02-18 | End: 2025-02-18

## 2025-02-18 RX ADMIN — FENTANYL CITRATE 50 MCG: 50 INJECTION, SOLUTION INTRAMUSCULAR; INTRAVENOUS at 08:26

## 2025-02-18 RX ADMIN — CEFAZOLIN 3 G: 3 INJECTION, POWDER, FOR SOLUTION INTRAVENOUS at 08:15

## 2025-02-18 RX ADMIN — FENTANYL CITRATE 50 MCG: 50 INJECTION, SOLUTION INTRAMUSCULAR; INTRAVENOUS at 08:05

## 2025-02-18 RX ADMIN — LIDOCAINE HYDROCHLORIDE 60 MG: 20 INJECTION, SOLUTION EPIDURAL; INFILTRATION; INTRACAUDAL; PERINEURAL at 08:05

## 2025-02-18 RX ADMIN — MIDAZOLAM 2 MG: 1 INJECTION INTRAMUSCULAR; INTRAVENOUS at 07:57

## 2025-02-18 RX ADMIN — Medication 20 MG: at 09:45

## 2025-02-18 RX ADMIN — Medication 20 MG: at 09:04

## 2025-02-18 RX ADMIN — KETOROLAC TROMETHAMINE 30 MG: 30 INJECTION, SOLUTION INTRAMUSCULAR at 12:26

## 2025-02-18 RX ADMIN — ONDANSETRON 4 MG: 2 INJECTION, SOLUTION INTRAMUSCULAR; INTRAVENOUS at 10:35

## 2025-02-18 RX ADMIN — HYDROMORPHONE HYDROCHLORIDE 0.5 MG: 1 INJECTION, SOLUTION INTRAMUSCULAR; INTRAVENOUS; SUBCUTANEOUS at 11:29

## 2025-02-18 RX ADMIN — HYDROMORPHONE HYDROCHLORIDE 0.5 MG: 1 INJECTION, SOLUTION INTRAMUSCULAR; INTRAVENOUS; SUBCUTANEOUS at 11:05

## 2025-02-18 RX ADMIN — HYDROMORPHONE HYDROCHLORIDE 0.5 MG: 1 INJECTION, SOLUTION INTRAMUSCULAR; INTRAVENOUS; SUBCUTANEOUS at 09:04

## 2025-02-18 RX ADMIN — HYDROMORPHONE HYDROCHLORIDE 0.5 MG: 1 INJECTION, SOLUTION INTRAMUSCULAR; INTRAVENOUS; SUBCUTANEOUS at 10:13

## 2025-02-18 RX ADMIN — HYDROMORPHONE HYDROCHLORIDE 0.5 MG: 1 INJECTION, SOLUTION INTRAMUSCULAR; INTRAVENOUS; SUBCUTANEOUS at 11:59

## 2025-02-18 RX ADMIN — HYDROMORPHONE HYDROCHLORIDE 0.5 MG: 1 INJECTION, SOLUTION INTRAMUSCULAR; INTRAVENOUS; SUBCUTANEOUS at 11:19

## 2025-02-18 RX ADMIN — PROPOFOL 200 MG: 10 INJECTION, EMULSION INTRAVENOUS at 08:05

## 2025-02-18 RX ADMIN — SODIUM CHLORIDE, POTASSIUM CHLORIDE, SODIUM LACTATE AND CALCIUM CHLORIDE: 600; 310; 30; 20 INJECTION, SOLUTION INTRAVENOUS at 08:05

## 2025-02-18 RX ADMIN — Medication 2 L/MIN: at 12:00

## 2025-02-18 RX ADMIN — DEXAMETHASONE SODIUM PHOSPHATE 10 MG: 10 INJECTION INTRAMUSCULAR; INTRAVENOUS at 08:15

## 2025-02-18 RX ADMIN — HYDROMORPHONE HYDROCHLORIDE 0.5 MG: 1 INJECTION, SOLUTION INTRAMUSCULAR; INTRAVENOUS; SUBCUTANEOUS at 11:37

## 2025-02-18 RX ADMIN — Medication 200 MCG: at 09:20

## 2025-02-18 RX ADMIN — ONDANSETRON 4 MG: 2 INJECTION INTRAMUSCULAR; INTRAVENOUS at 11:58

## 2025-02-18 RX ADMIN — MIDAZOLAM 2 MG: 1 INJECTION INTRAMUSCULAR; INTRAVENOUS at 08:01

## 2025-02-18 RX ADMIN — HYDROMORPHONE HYDROCHLORIDE 0.5 MG: 1 INJECTION, SOLUTION INTRAMUSCULAR; INTRAVENOUS; SUBCUTANEOUS at 10:55

## 2025-02-18 RX ADMIN — Medication 10 MG: at 10:14

## 2025-02-18 SDOH — HEALTH STABILITY: MENTAL HEALTH: CURRENT SMOKER: 0

## 2025-02-18 ASSESSMENT — PAIN - FUNCTIONAL ASSESSMENT
PAIN_FUNCTIONAL_ASSESSMENT: 0-10
PAIN_FUNCTIONAL_ASSESSMENT: WONG-BAKER FACES
PAIN_FUNCTIONAL_ASSESSMENT: 0-10

## 2025-02-18 ASSESSMENT — PAIN SCALES - GENERAL
PAINLEVEL_OUTOF10: 7
PAINLEVEL_OUTOF10: 4
PAINLEVEL_OUTOF10: 7
PAINLEVEL_OUTOF10: 0 - NO PAIN
PAINLEVEL_OUTOF10: 7
PAINLEVEL_OUTOF10: 3
PAINLEVEL_OUTOF10: 4
PAINLEVEL_OUTOF10: 5 - MODERATE PAIN
PAINLEVEL_OUTOF10: 7
PAINLEVEL_OUTOF10: 3
PAINLEVEL_OUTOF10: 7
PAINLEVEL_OUTOF10: 3

## 2025-02-18 ASSESSMENT — COLUMBIA-SUICIDE SEVERITY RATING SCALE - C-SSRS
2. HAVE YOU ACTUALLY HAD ANY THOUGHTS OF KILLING YOURSELF?: NO
6. HAVE YOU EVER DONE ANYTHING, STARTED TO DO ANYTHING, OR PREPARED TO DO ANYTHING TO END YOUR LIFE?: NO
1. IN THE PAST MONTH, HAVE YOU WISHED YOU WERE DEAD OR WISHED YOU COULD GO TO SLEEP AND NOT WAKE UP?: NO

## 2025-02-18 ASSESSMENT — PAIN SCALES - WONG BAKER: WONGBAKER_NUMERICALRESPONSE: HURTS EVEN MORE

## 2025-02-18 NOTE — DISCHARGE INSTRUCTIONS
Dr. Baum's Post-op Foot and Ankle Instructions    - Please keep your splint in place until follow-up.  Do not get your splint wet.  If your splint becomes saturated with drainage please cover it with an additional Ace wrap.  Do not remove any ace wraps from the splint.  If your splint becomes wet or continues to saturate, please contact the office as it will need to be changed prior to your scheduled follow-up.  -Do not put any weight on your operative extremity.  I recommend using a rolling knee scooter.  These can be ordered online or a prescription can be provided through our office.  -Please take the pain medication prescribed as directed.  You can also use ibuprofen unless your primary doctor has directed you not to use this.  Please do not use ibuprofen if it irritates your stomach or if you have kidney issues or on blood thinners.  Do not take more than 800 mg of ibuprofen every 8 hours. You can also alternate ibuprofen with Tylenol (acetaminophen) so you are taking a dose of either Ibuprofen or Tylenol every 4 hours.  Do not take more than 650 mg of Tylenol every 8 hours.     -Please keep the foot elevated as much as possible, particularly for the first 2 weeks after surgery.  You should plan to keep your foot elevated at least 45 mins of every hour.  Try to keep the foot elevated above the level of your heart.  You can use a stack of pillows or blankets to achieve this height.    -Please take an Aspirin 81 mg twice daily for prevention of blood clots (deep vein thrombosis prophylaxis).  This has not been prescribed to you and you will need to obtain this over the counter from your pharmacy.  -Narcotic pain medications can cause constipation.  You can either take Senna or use metamucil to help with regular bowel movements should you develop constipation.  These can be obtained over the counter.  Please remember to stay hydrated and drink plenty of water as this helps to prevent constipation too.  -You  have also been prescribed Ondansetron (Zofran) to help with nausea.  This can be taken as needed.  -Please call the office with any questions or concerns.  [phone] (main line) or [phone] (Lea Gomez- ).

## 2025-02-18 NOTE — ANESTHESIA PROCEDURE NOTES
Peripheral Block    Patient location during procedure: pre-op  Start time: 2/18/2025 7:45 AM  End time: 2/18/2025 7:48 AM  Reason for block: at surgeon's request and post-op pain management  Staffing  Performed: attending   Authorized by: Giorgi Crain MD    Performed by: Giorgi Crain MD  Preanesthetic Checklist  Completed: patient identified, IV checked, site marked, risks and benefits discussed, surgical consent, monitors and equipment checked, pre-op evaluation and timeout performed   Timeout performed at: 2/18/2025 7:44 AM  Peripheral Block  Patient position: laying flat  Prep: ChloraPrep  Block type: adductor canal  Laterality: left  Injection technique: single-shot  Local infiltration: bupivicaine  Infiltration strength: 0.5 %  Dose: 20 mL  Needle  Needle type: short-bevel   Needle gauge: 21 G  Needle length: 8 cm  Needle localization: ultrasound guidance     image stored in chart  Assessment  Injection assessment: negative aspiration for heme, no paresthesia on injection, incremental injection and local visualized surrounding nerve on ultrasound

## 2025-02-18 NOTE — ANESTHESIA POSTPROCEDURE EVALUATION
Patient: Monae Mayes    Procedure Summary       Date: 02/18/25 Room / Location: [facility] OR 10 / Virtual [facility]    Anesthesia Start: 0757 Anesthesia Stop: 1107    Procedures:       FUSION, SUBTALAR JOINT (Left: Foot)      FUSION, JOINT, FOOT (Left: Foot)      RECESSION, MUSCLE, GASTROCNEMIUS (Left: Foot) Diagnosis:       Acquired pes planovalgus, left      Ankle contracture, left      (M76.822 - Posterior tibial tendinitis, left leg, M21.42 - Flat foot [pes planus] (acquired), left foot)    Surgeons: Antwon Baum MD Responsible Provider: Giorgi Crain MD    Anesthesia Type: general, regional ASA Status: 2            Anesthesia Type: general, regional    Vitals Value Taken Time   /72 02/18/25 1108   Temp 36 02/18/25 1108   Pulse 73 02/18/25 1108   Resp 12 02/18/25 1108   SpO2 95 02/18/25 1108       Anesthesia Post Evaluation    Patient location during evaluation: PACU  Patient participation: complete - patient participated  Level of consciousness: awake and alert  Pain management: satisfactory to patient  Airway patency: patent  Cardiovascular status: acceptable  Respiratory status: acceptable, spontaneous ventilation, nonlabored ventilation and room air  Hydration status: acceptable  Postoperative Nausea and Vomiting: none        No notable events documented.

## 2025-02-18 NOTE — ANESTHESIA PROCEDURE NOTES
Airway  Date/Time: 2/18/2025 8:06 AM  Urgency: elective    Airway not difficult    Staffing  Performed: BASILIO   Authorized by: Giorgi Crain MD    Performed by: BASILIO Levine  Patient location during procedure: OR    Indications and Patient Condition  Indications for airway management: anesthesia  Spontaneous Ventilation: absent  Sedation level: deep  Preoxygenated: yes  Patient position: sniffing  MILS maintained throughout  Mask difficulty assessment: 1 - vent by mask    Final Airway Details  Final airway type: supraglottic airway      Successful airway: ProSeal (iGel)  Size 4  Airway Seal Pressure (cm H2O): 10     Number of attempts at approach: 1  Number of other approaches attempted: 0    Additional Comments  Lips and teeth in pre anesthetic conditions following LMA placement

## 2025-02-18 NOTE — ANESTHESIA PROCEDURE NOTES
Peripheral IV  Date/Time: 2/18/2025 8:10 AM  Inserted by: BASILIO Levine    Placement  Needle size: 20 G  Laterality: left  Location: hand  Local anesthetic: none  Site prep: alcohol  Technique: anatomical landmarks  Attempts: 1

## 2025-02-18 NOTE — ANESTHESIA PROCEDURE NOTES
Peripheral Block    Patient location during procedure: pre-op  Start time: 2/18/2025 7:25 AM  End time: 2/18/2025 7:45 AM  Reason for block: at surgeon's request and post-op pain management  Staffing  Performed: attending   Authorized by: Giorgi Crain MD    Performed by: Giorgi Crain MD  Preanesthetic Checklist  Completed: patient identified, IV checked, site marked, risks and benefits discussed, surgical consent, monitors and equipment checked, pre-op evaluation and timeout performed   Timeout performed at: 2/18/2025 7:25 AM  Peripheral Block  Patient position: laying flat  Prep: ChloraPrep  Block type: popliteal  Laterality: left  Injection technique: single-shot  Local infiltration: bupivicaine  Infiltration strength: 0.5 %  Dose: 20 mL  Needle  Needle type: short-bevel   Needle gauge: 21 G  Needle length: 8 cm  Needle localization: ultrasound guidance     image stored in chart  Assessment  Injection assessment: negative aspiration for heme, no paresthesia on injection, incremental injection and local visualized surrounding nerve on ultrasound

## 2025-02-18 NOTE — ANESTHESIA PREPROCEDURE EVALUATION
Patient: Monae Mayes    Procedure Information       Date/Time: 02/18/25 0730    Procedures:       FUSION, SUBTALAR JOINT (Left: Foot)      FUSION, JOINT, FOOT (Left: Foot)      RECESSION, MUSCLE, GASTROCNEMIUS (Left: Foot)    Location: [facility] OR 10 / [facility]    Surgeons: Antwon Baum MD            Relevant Problems   No relevant active problems       Clinical information reviewed:   Tobacco  Allergies  Meds   Med Hx  Surg Hx  OB Status  Fam Hx  Soc   Hx        NPO Detail:  NPO/Void Status  Carbohydrate Drink Given Prior to Surgery? : N  Date of Last Liquid: 02/17/25  Time of Last Liquid: 1900  Date of Last Solid: 02/17/25  Time of Last Solid: 1900  Last Intake Type: Clear fluids  Time of Last Void: 0710         Physical Exam    Airway  Mallampati: II  TM distance: >3 FB  Neck ROM: full     Cardiovascular   Rhythm: regular  Rate: normal     Dental - normal exam     Pulmonary   Breath sounds clear to auscultation     Abdominal            Anesthesia Plan    History of general anesthesia?: yes  History of complications of general anesthesia?: no    ASA 2     general     The patient is not a current smoker.  Education provided regarding risk of obstructive sleep apnea.  intravenous induction   Postoperative administration of opioids is intended.  Anesthetic plan and risks discussed with patient.    Plan discussed with CAA.

## 2025-02-19 NOTE — OP NOTE
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]     Procedures  FUSION, SUBTALAR JOINT  21284 - [place] ARTHRODESIS SUBTALAR    FUSION, JOINT, FOOT  15706 - [place] ARTHRODESIS MIDTARSOMETATARSAL SINGLE JOINT    RECESSION, MUSCLE, GASTROCNEMIUS  49339 - [place] GASTROCNEMIUS RECESSION      Surgeons      * Antwon Baum - Primary    Resident/Fellow/Other Assistant:  Surgeons and Role:  * No surgeons found with a matching role *    Staff:   Surgical Assistant:   Circulator: Marjorie Loomisub Person: Galina Loomisub Person: Lavell Do Circulator: Laurent    Anesthesia Staff: Anesthesiologist: Giorgi Crain MD  C-AA: BASILIO Levine    Procedure Summary  Anesthesia: Regional, General  ASA: II  Estimated Blood Loss: 25 mL  Intra-op Medications:   Administrations occurring from 0730 to 1050 on 25:   Medication Name Total Dose   ceFAZolin (Ancef) 3 g in dextrose 5%  mL 3 g   dexAMETHasone (Decadron) PF injection 10 mg/mL 10 mg   fentaNYL (Sublimaze) injection 50 mcg/mL 100 mcg   HYDROmorphone (Dilaudid) injection 1 mg/mL 1 mg   ketamine injection 50 mg/ 5 mL (10 mg/mL) 50 mg   LR infusion Cannot be calculated   lidocaine PF (Xylocaine-MPF) local injection 2 % 60 mg   midazolam (Versed) injection 1 mg/mL 4 mg   ondansetron (Zofran) 2 mg/mL injection 4 mg   phenylephrine 100 mcg/mL syringe 10 mL (prefilled) 200 mcg   propofol (Diprivan) injection 10 mg/mL 200 mg              Anesthesia Record               Intraprocedure I/O Totals          Intake    LR infusion 1000.00 mL    Total Intake 1000 mL          Specimen: No  specimens collected     Drains and/or Catheters: * None in log *    Tourniquet Times:     Total Tourniquet Time Documented:  Thigh (Left) - 120 minutes  Total: Thigh (Left) - 120 minutes      Implants:  Implants       Type Name Action Serial No.      Other arthrocell plus allograft 2.5 cc Implanted [id number]     Screw STAPLE, DYNANITE SUPERMX, W/INSTRUMENTION, 20W X 20L - SN/A - [id number] Implanted N/A     Screw screw 75x70 mm Implanted N/A     Screw screw 50x70 mm Implanted N/A     Screw STAPLE, DYNANITE SUPERMX, W/INSTRUMENTION, 20W X 20L - SN/A - [id number] Implanted N/A            Findings: Left symptomatic pes planovalgus    Indications: Monae Mayes is an 59 y.o. female who is having surgery for M76.822 - Posterior tibial tendinitis, left leg, M21.42 - Flat foot [pes planus] (acquired), left foot. Patient had presented to my clinic with complaints of a painful left pes planovalgus foot deformity.  She had had prior issues related to a right pes planovalgus foot deformity and had undergone a previous flatfoot reconstruction on the right side when she had ruptured her posterior tibialis tendon approximately 15 years prior.  She reports that she had done well following the prior reconstruction on the right side.  She was now having worsening pain in the left foot most of which localized to the distal posterior tibialis tendon insertion as well as the sinus tarsi region.  Based on her worsening symptoms limiting her activities she desired more definitive surgical treatment on the left side as she had done well following treatment on the right side.  We had obtained an MRI which demonstrated split tearing of her posterior tibialis tendon.  We discussed options for both a flexible flatfoot reconstruction versus a hindfoot arthrodesis.  I discussed with her that I felt in this situation the hindfoot arthrodesis would be more reliable and provide a more enduring correction of her deformity.  She considered  her options and expressed to me that she wished to proceed forward with a hindfoot arthrodesis.  I therefore recommended a double arthrodesis of the subtalar and talonavicular joints done in conjunction with a gastrocnemius recession as she was found to have a gastrocnemius contracture.  We reviewed the risk benefits and alternatives to surgery as well as expected postoperative course.  We discussed risk including nonunion, malunion, need for additional procedures, infection, wound healing complications, damage to surrounding nerves or blood vessels, pain, stiffness, bleeding, risk of blood clots and risk of anesthetic.  She expressed understanding of these risks and wished to proceed forward with surgical intervention.    The patient was seen in the preoperative area. The risks, benefits, complications, treatment options, non-operative alternatives, expected recovery and outcomes were discussed with the patient. The possibilities of reaction to medication, pulmonary aspiration, injury to surrounding structures, bleeding, recurrent infection, the need for additional procedures, failure to diagnose a condition, and creating a complication requiring transfusion or operation were discussed with the patient. The patient concurred with the proposed plan, giving informed consent.  The site of surgery was properly marked. The patient has been actively warmed in preoperative area. Preoperative antibiotics have been ordered and given within 1 hours of incision. Venous thrombosis prophylaxis have been ordered including unilateral sequential compression device    Procedure Details: After giving informed consent and being marked in the preoperative holding area the patient was brought back to the operating room.  She had previously received a popliteal and saphenous nerve block by the anesthesia team in the preoperative holding area.  She was subsequently transferred to the operative table and placed in the supine position.   Preoperative antibiotics were given and general anesthesia was administered.  All bony prominences were well-padded.  A nonsterile pneumatic thigh tourniquet was then applied to the left lower extremity.  This was inflated at the beginning of the case and let down prior to wound closure to ensure appropriate hemostasis had been achieved.  The left lower extremity was then prepped and draped in the usual sterile fashion.  A timeout was then performed to confirm the correct patient, procedure, laterality and site of surgery.  We confirmed that preoperative antibiotics have been given and that all implants were available in the room.  The surgical team was in agreement.    We began the procedure by exsanguinating the operative left extremity utilizing an Esmarch bandage.  The thigh tourniquet was then inflated to 275 mmHg.    We then began the procedure with our gastrocnemius resection.  A medial incision was created over the calf proximal to the musculotendinous junction of the Achilles.  This was carried through skin and subcutaneous tissue.  The saphenous nerve and vein did not enter our dissection.  The underlying fascia of the posterior compartment was then incised.  The interval between the gastrocnemius and soleus was identified.  Blunt dissection was utilized to develop this plane.  A vaginal speculum was then placed into the calf to help with visualization.  While holding the foot in a maximally dorsiflexed position the deep fascia of the gastrocnemius was then incised taking care to protect the underlying muscle.  Once the underlying fascia was incised the gastrocnemius contracture was corrected and we were able to achieve about 10° of dorsiflexion.  At this point the wound was then copiously irrigated with sterile normal saline.  The incision was then closed utilizing 2-0 Vicryl to close the deep subcutaneous tissues followed by 3-0 Monocryl to close the deep dermis and 3-0 nylon in a horizontal mattress  fashion to close the skin.  We then turned our attention to performing the hindfoot arthrodesis.  I began this portion of the procedure by creating a sinus Tarsi approach over the lateral hindfoot.  This was carried sharply through skin and subcutaneous tissue utilizing a 15 blade knife.  Crossing nerve branches of the sural nerve were identified and protected.  The peroneal tendons were identified and protected.  The subtalar joint was then subsequently exposed.  A lamina  was then placed in the sinus tarsi.  A sharp osteotome was utilized to divide the interosseous ligaments.  At this point the cartilage in the subtalar joint was subsequently removed utilizing a combination of sharp osteotomes curettes and a rongeur.  Once we are pleased with the adequacy of our debridement of the subtalar joint a 3.5 mm drill bit was then used to fenestrate the bone in preparation for subsequent arthrodesis.  At this point we then turned our attention to repairing the subtalar joint for arthrodesis.  A dorsal incision was created overlying the talonavicular joint.  This was carried sharply through skin and subcutaneous tissue.  Crossing nerve branches of the superficial peroneal nerve were identified and protected.  The deep peroneal neurovascular bundle was also identified and protected during our dissection.  The dissection was carried down to the level of the talonavicular joint.  The capsule around the talonavicular joint was then subsequently elevated sharply utilizing a 15 blade knife.  The joint was then again prepared utilizing a combination of sharp osteotomes, curettes and a rongeur once the joint was distracted utilizing a pin distractor.  Once the joint was adequately prepared it was copiously irrigated with sterile normal saline.  The joint was then fenestrated in preparation for subsequent arthrodesis again utilizing a 3.5 mm drill bit.  At this point 2.5 cc of Arthrex allograft were then placed between the  subtalar and talonavicular joints in preparation for arthrodesis.  We then turned our attention back to the subtalar joint.  Holding left foot in a physiologic valgus hindfoot alignment we then proceeded to pass a wire for a cannulated fully threaded 7 mm compression screw.  This was placed through a secondary counterincision from the anteromedial neck of the talus into the posterior lateral calcaneal body.  Fluoroscopic images were then obtained including a lateral fluoroscopic image of the foot as well as a Quesada axial heel view demonstrating acceptable position of the guidewire and alignment of the subtalar joint.  I then proceeded to drill for and place a fully threaded 7 mm cannulated screw stabilizing the subtalar joint.  At this point I then proceeded to place a second guidewire for an additional fully threaded compression screw to fixate the subtalar joint.  This was placed from the lateral calcaneal body into the central aspect of the talar dome through another counter incision.  The position of this was confirmed on both AP ankle and lateral foot fluoroscopic images.  I then proceeded to drill for measure and placed a second screw.  The 2 screws achieved excellent stability of the subtalar arthrodesis.  At this point we then turned our attention to fixating the talonavicular joint.  At the talonavicular joint was then aligned for arthrodesis correcting abduction at the talonavicular joint.  This was pinned in place.  Once this was done we then obtained AP foot and lateral fluoroscopic images demonstrating acceptable alignment of the talonavicular joint.  I then elected to place 2 20 x 20 mm nitinol staples to stabilize the joint.  Prior to doing this we drilled for the staples and placed pins which demonstrated subsequent acceptable position for the staples.  The pins were removed and the staples were placed.  This achieved excellent compression across the talonavicular joint.  At this point final AP  ankle, AP foot, lateral foot and Quesada axial fluoroscopic images were obtained.  These demonstrated acceptable alignment of the hindfoot as well as acceptable placement of all hardware.  At this point the tourniquet was let down and hemostasis was achieved.  The wounds were then closed in a layered fashion utilizing 2-0 Vicryl to close the deep subcutaneous tissues followed by 3-0 Monocryl to close the deep dermis and 3-0 nylon in a horizontal mattress fashion to close the skin.  Sterile dressings were then applied and the patient was placed into a well-padded posterior slab splint.  The patient was then awoken from anesthesia and transferred to PACU in stable condition having tolerated the procedure well.  All counts were correct at the end of the procedure.    Postoperatively the patient will be nonweightbearing and immobilized for the first 6 weeks after surgery.  She will be discharged to home.  She will be on aspirin for DVT prophylaxis for 4 weeks postoperatively.  She will return to clinic in 2 weeks for a wound check and suture removal.  Complications:  None; patient tolerated the procedure well.    Disposition: PACU - hemodynamically stable.  Condition: stable     Additional Details: None    Attending Attestation: I was present and scrubbed for the entire procedure.    Antwon Baum  Phone Number: [phone]

## 2025-03-02 LAB
ATRIAL RATE: 80 BPM
P AXIS: 20 DEGREES
P OFFSET: 192 MS
P ONSET: 148 MS
PR INTERVAL: 140 MS
Q ONSET: 218 MS
QRS COUNT: 13 BEATS
QRS DURATION: 78 MS
QT INTERVAL: 388 MS
QTC CALCULATION(BAZETT): 447 MS
QTC FREDERICIA: 427 MS
R AXIS: 5 DEGREES
T AXIS: 15 DEGREES
T OFFSET: 412 MS
VENTRICULAR RATE: 80 BPM

## (undated) DEVICE — Device

## (undated) DEVICE — DRESSING, GAUZE, 12 PLY, CURITY, 4 X 8 IN 2S

## (undated) DEVICE — APPLICATOR, CHLORAPREP, W/ORANGE TINT, 26ML

## (undated) DEVICE — DRESSING, GAUZE, SUPER KERLIX, 6X6

## (undated) DEVICE — GUIDEWIRE, W/ TROCAR TIP, 2.4MM X 23.5CM

## (undated) DEVICE — DRESSING, GAUZE, PETROLATUM, STRIP, XEROFORM, 1 X 8 IN, STERILE

## (undated) DEVICE — DRESSING, GAUZE, PETROLATUM, PATCH, XEROFORM, 1 X 8 IN, STERILE

## (undated) DEVICE — WOUND SYSTEM, DEBRIDEMENT & CLEANING, O.R DUOPAK

## (undated) DEVICE — BANDAGE, ELASTIC, MATRIX, SELF-CLOSURE, 4 IN X 5 YD, LF

## (undated) DEVICE — SUTURE, VICRYL, 2-0, 27 IN, SH, UNDYED

## (undated) DEVICE — DRAPE MINI, C-ARM, 54 X 64 IN

## (undated) DEVICE — PADDING, WEBRIL, UNDERCAST, STERILE, 6 IN

## (undated) DEVICE — SOLUTION, IRRIGATION, SODIUM CHLORIDE 0.9%, 1000 ML, POUR BOTTLE

## (undated) DEVICE — BANDAGE, ELASTIC, PREMIUM, SELF-CLOSE, 6 IN X 5.5 YD, STERILE

## (undated) DEVICE — GLOVE, SURGICAL, PROTEXIS PI BLUE W/NEUTHERA, 7.5, PF, LF

## (undated) DEVICE — GLOVE, SURGEON, PREMIERPRO PI, MICRO, SZ-7.5, PF, WH

## (undated) DEVICE — SPONGE, KERLIX ROLL, 4.5 X 4.1 YD, 6 PLY, BULK, LG, LF

## (undated) DEVICE — BATH BLANKET STERILE

## (undated) DEVICE — SUTURE, MONOCRYL, 3-0, 36 IN, CT-1, UNDYED

## (undated) DEVICE — GLOVE, SURGICAL, PROTEXIS PI , 7.5, PF, LF

## (undated) DEVICE — DRESSING, ABDOMINAL PAD, CURITY, 8 X 10 IN

## (undated) DEVICE — SOLUTION, IRRIGATION, STERILE WATER, 1000 ML, POUR BOTTLE

## (undated) DEVICE — SUTURE, ETHILON, 3-0, 18 IN, PS2, BLACK

## (undated) DEVICE — TOWEL PACK, STERILE, 4/PACK, BLUE

## (undated) DEVICE — GUIDEWIRE, W/ TROCAR TIP, 1.6 X 235MM

## (undated) DEVICE — SPONGE, LAP, XRAY DECT, 4IN X 18IN, W/MASTER DMT, STERILE

## (undated) DEVICE — BANDAGE, ESMARK, 6 IN X 9 FT, STERILE

## (undated) DEVICE — SUTURE, MONOCRYL, 3-0, 27 IN, PS-2, UNDYED

## (undated) DEVICE — PADDING, WEBRIL, UNDERCAST, STERILE, 4 IN

## (undated) DEVICE — DRILL BIT, CANNULATED, 5.0MM

## (undated) DEVICE — BANDAGE, ESMARK, 4 IN X 12 FT, LF

## (undated) DEVICE — DRILL BIT, 3.5MM

## (undated) DEVICE — BANDAGE, ELASTIC, ACE, SELF-CLOSURE, 4 IN X 5 YD, NONSTERILE

## (undated) DEVICE — DRESSING, GAUZE, 16 PLY, 4 X 4 IN, STERILE

## (undated) DEVICE — STRIP, SKIN CLOSURE, STERI STRIP, REINFORCED, 0.5 X 4 IN

## (undated) DEVICE — COVER, EQUIPMENT, C ARM